# Patient Record
Sex: MALE | Race: WHITE | NOT HISPANIC OR LATINO | Employment: FULL TIME
[De-identification: names, ages, dates, MRNs, and addresses within clinical notes are randomized per-mention and may not be internally consistent; named-entity substitution may affect disease eponyms.]

---

## 2017-01-05 ENCOUNTER — RECORDS - HEALTHEAST (OUTPATIENT)
Dept: ADMINISTRATIVE | Facility: OTHER | Age: 52
End: 2017-01-05

## 2017-01-05 ENCOUNTER — COMMUNICATION - HEALTHEAST (OUTPATIENT)
Dept: FAMILY MEDICINE | Facility: CLINIC | Age: 52
End: 2017-01-05

## 2017-01-11 ENCOUNTER — OFFICE VISIT - HEALTHEAST (OUTPATIENT)
Dept: FAMILY MEDICINE | Facility: CLINIC | Age: 52
End: 2017-01-11

## 2017-01-11 DIAGNOSIS — L08.9 SKIN INFECTION: ICD-10-CM

## 2017-01-11 DIAGNOSIS — Z01.818 PREOP EXAMINATION: ICD-10-CM

## 2017-01-11 DIAGNOSIS — M17.11 DEGENERATIVE JOINT DISEASE OF KNEE, RIGHT: ICD-10-CM

## 2017-01-11 DIAGNOSIS — J45.20 ASTHMA, MILD INTERMITTENT, WELL-CONTROLLED: ICD-10-CM

## 2017-01-11 ASSESSMENT — MIFFLIN-ST. JEOR: SCORE: 1684.27

## 2017-01-12 ENCOUNTER — AMBULATORY - HEALTHEAST (OUTPATIENT)
Dept: FAMILY MEDICINE | Facility: CLINIC | Age: 52
End: 2017-01-12

## 2017-02-17 ENCOUNTER — THERAPY VISIT (OUTPATIENT)
Dept: PHYSICAL THERAPY | Facility: CLINIC | Age: 52
End: 2017-02-17
Payer: COMMERCIAL

## 2017-02-17 DIAGNOSIS — Z47.1 AFTERCARE FOLLOWING RIGHT KNEE JOINT REPLACEMENT SURGERY: Primary | ICD-10-CM

## 2017-02-17 DIAGNOSIS — Z96.651 AFTERCARE FOLLOWING RIGHT KNEE JOINT REPLACEMENT SURGERY: Primary | ICD-10-CM

## 2017-02-17 PROCEDURE — 97161 PT EVAL LOW COMPLEX 20 MIN: CPT | Mod: GP | Performed by: PHYSICAL THERAPIST

## 2017-02-17 PROCEDURE — 97110 THERAPEUTIC EXERCISES: CPT | Mod: GP | Performed by: PHYSICAL THERAPIST

## 2017-02-17 PROCEDURE — 97140 MANUAL THERAPY 1/> REGIONS: CPT | Mod: GP | Performed by: PHYSICAL THERAPIST

## 2017-02-17 ASSESSMENT — ACTIVITIES OF DAILY LIVING (ADL)
KNEE_ACTIVITY_OF_DAILY_LIVING_SUM: 19
GO UP STAIRS: ACTIVITY IS FAIRLY DIFFICULT
LIMPING: THE SYMPTOM AFFECTS MY ACTIVITY MODERATELY
SWELLING: THE SYMPTOM AFFECTS MY ACTIVITY SEVERELY
SQUAT: I AM UNABLE TO DO THE ACTIVITY
AS_A_RESULT_OF_YOUR_KNEE_INJURY,_HOW_WOULD_YOU_RATE_YOUR_CURRENT_LEVEL_OF_DAILY_ACTIVITY?: ABNORMAL
GO DOWN STAIRS: ACTIVITY IS FAIRLY DIFFICULT
KNEEL ON THE FRONT OF YOUR KNEE: I AM UNABLE TO DO THE ACTIVITY
RAW_SCORE: 19
HOW_WOULD_YOU_RATE_THE_CURRENT_FUNCTION_OF_YOUR_KNEE_DURING_YOUR_USUAL_DAILY_ACTIVITIES_ON_A_SCALE_FROM_0_TO_100_WITH_100_BEING_YOUR_LEVEL_OF_KNEE_FUNCTION_PRIOR_TO_YOUR_INJURY_AND_0_BEING_THE_INABILITY_TO_PERFORM_ANY_OF_YOUR_USUAL_DAILY_ACTIVITIES?: 30
STAND: ACTIVITY IS FAIRLY DIFFICULT
STIFFNESS: THE SYMPTOM AFFECTS MY ACTIVITY SEVERELY
GIVING WAY, BUCKLING OR SHIFTING OF KNEE: THE SYMPTOM AFFECTS MY ACTIVITY MODERATELY
KNEE_ACTIVITY_OF_DAILY_LIVING_SCORE: 27.14
PAIN: THE SYMPTOM AFFECTS MY ACTIVITY MODERATELY
SIT WITH YOUR KNEE BENT: I AM UNABLE TO DO THE ACTIVITY
HOW_WOULD_YOU_RATE_THE_OVERALL_FUNCTION_OF_YOUR_KNEE_DURING_YOUR_USUAL_DAILY_ACTIVITIES?: ABNORMAL
RISE FROM A CHAIR: ACTIVITY IS FAIRLY DIFFICULT
WEAKNESS: THE SYMPTOM AFFECTS MY ACTIVITY SEVERELY
WALK: ACTIVITY IS FAIRLY DIFFICULT

## 2017-02-17 NOTE — MR AVS SNAPSHOT
"              After Visit Summary   2/17/2017    Lukas Gautam    MRN: [id number]           Patient Information     Date Of Birth          1965        Visit Information        Provider Department      2/17/2017 11:00 AM Chelsie Mancuso PT [facility]        Today's Diagnoses     Aftercare following right knee joint replacement surgery    -  1       Follow-ups after your visit        Your next 10 appointments already scheduled     Feb 28, 2017 11:40 AM [facility]   DUSTIN Extremity with Chelsie Dela Cruz PT   [facility]    [address] [id number] [phone]              Who to contact     If you have questions or need follow up information about today's clinic visit or your schedule please contact [facility] PHYSICAL [facility] directly at [phone].  Normal or non-critical lab and imaging results will be communicated to you by DailySt[employer], letter or phone within 4 business days after the clinic has received the results. If you do not hear from us within 7 days, please contact the clinic through DailyStrength[employer] or phone. If you have a critical or abnormal lab result, we will notify you by phone as soon as possible.  Submit refill requests through [employer] or call your pharmacy and they will forward the refill request to us. Please allow 3 business days for your refill to be completed.          Additional Information About Your Visit        [employer] Information     [employer] lets you send messages to your doctor, view your test results, renew your prescriptions, schedule appointments and more. To sign up, go to [email or web address] . Click on \"Log in\" on the left side of the screen, which will take you to the Welcome page. Then click on \"Sign up Now\" on the right side of the page.     You will be asked to " enter the access code listed below, as well as some personal information. Please follow the directions to create your username and password.     Your access code is: [email or web address]  Expires: 2017  3:51 PM     Your access code will  in 90 days. If you need help or a new code, please call your [facility] clinic or [phone].        Care EveryWhere ID     This is your Care EveryWhere ID. This could be used by other organizations to access your [facility] medical records  [id number]         Blood Pressure from Last 3 Encounters:   No data found for BP    Weight from Last 3 Encounters:   No data found for Wt              We Performed the Following     HC PT EVAL, LOW COMPLEXITY     DUSTIN INITIAL EVAL REPORT     MANUAL THER TECH,1+REGIONS,EA 15 MIN     THERAPEUTIC EXERCISES        Primary Care Provider    None Specified       No primary provider on file.        Thank you!     Thank you for choosing [facility]ER[facility]  for your care. Our goal is always to provide you with excellent care. Hearing back from our patients is one way we can continue to improve our services. Please take a few minutes to complete the written survey that you may receive in the mail after your visit with us. Thank you!             Your Updated Medication List - Protect others around you: Learn how to safely use, store and throw away your medicines at www.disposemymeds.org.      Notice  As of 2017  3:51 PM    You have not been prescribed any medications.

## 2017-02-17 NOTE — LETTER
[facility] PHYSICAL THERAPY  [address]  [facility] [address]  [phone]    2017    Re: Lukas Gautam   :   1965  MRN:  [id number]   REFERRING PHYSICIAN:   Gianfranco Rai    [facility] PHYSICAL [facility]    Date of Initial Evaluation:  2017  Visits:  Rxs Used: 1  Reason for Referral:  Aftercare following right knee joint replacement surgery    EVALUATION SUMMARY    Subjective:  Lukas Gautam is a 52 year old male with a right knee condition.  Condition occurred with:  Degenerative joint disease. This is a new condition  Patient underwent a R TKA on 2/10/17.    Patient reports pain:  In the joint (general). Pain is described as aching and is constant and reported as 4/10.  Associated symptoms:  Buckling/giving out, loss of strength, loss of motion/stiffness, edema, numbness and tingling. Pain is worse during the night.  Symptoms are exacerbated by bending/squatting, ascending stairs, descending stairs, standing, walking, kneeling and transfers (sitting with knee bent) and relieved by ice.  Since onset symptoms are gradually improving.  Special testing: none since surgery.      General health as reported by patient is excellent.  Pertinent medical history includes:  None.  Medical allergies: no.  Other surgeries include:  Orthopedic surgery (s/p L BLANKA 16).  Current medications:  Pain medication.  Current occupation is .  Patient is working in normal job with restrictions.  Primary job tasks include:  Prolonged sitting (computer).  Barriers include:  Stairs.  Red flags:  None as reported by the patient.                  Objective:  Gait:    Gait Type:  Antalgic   Assistive Devices:  Crutches  Deviations:  Knee:  Knee flexion decr R and knee extension decr RAnkle:  Heel strike decr L and push off decr RGeneral Deviations:  Merle decr and stance time  decr  Flexibility/Screens:   Lower Extremity:  Decreased right lower extremity flexibility:  Quadriceps; Hamstrings and Gastroc       Knee Evaluation:  ROM:    AROM  Hyperextension: Left:     Right: 0  Extension: Left:    Right:  5  Flexion: Left:   Right: 98  PROM  Flexion: Left:   Right:  100 while on bike  Pain: and edema limiting R knee AROM  Strength:   Extension:  Left: 5/5    Pain:-      Right: 3-/5    Pain:+  Flexion:  Left: 5/5    Pain:-      Right: 3-/5    Pain:+    Quad Set Left:  WNL    Pain: -   Quad Set Right:  Good    Pain: +  Palpation:    Right knee tenderness present at:  Patellar Medial; Patellar Lateral and Patellar Superior  Edema:  Edema of the knee: pitting edema along lateral aspext of proximal lower leg and proximal  posterolateral thigh, echymosis visble about posterolateral thigh, ballotment of patella   Circumference:  Joint Line:  Left:  41 cm   Right:  48 cm  Functional Testing:    Proprioception:   Stork Balance Test:  Left:  60 seconds  Right:  Unable  % of Uninvolved:     Assessment/Plan:    Patient is a 52 year old male with right side knee complaints.    Patient has the following significant findings with corresponding treatment plan.                Diagnosis 1:  S/p R TKA  Pain -  hot/cold therapy, manual therapy, self management, education and home program  Decreased ROM/flexibility - manual therapy, therapeutic exercise and home program  Decreased strength - therapeutic exercise, therapeutic activities and home program  Decreased proprioception - neuro re-education, gait training, therapeutic activities and home program  Impaired gait - gait training, assistive devices and home program  Decreased function - therapeutic activities and home program    Therapy Evaluation Codes:   1) History comprised of:   Personal factors that impact the plan of care:      None.    Comorbidity factors that impact the plan of care are:      None.     Medications impacting care:  Pain.  2) Examination of Body Systems comprised of:   Body structures and functions that impact the plan of care:      Hip and Knee.   Activity limitations that impact the plan of care are:      Bathing, Bending, Driving, Sitting, Squatting/kneeling, Standing and Walking.  3) Clinical presentation characteristics are:   Stable/Uncomplicated.  4) Decision-Making    Low complexity using standardized patient assessment instrument and/or measureable assessment of functional outcome.  Re: Lukas Gautam   :   1965    Cumulative Therapy Evaluation is: Low complexity.    Previous and current functional limitations:  (See Goal Flow Sheet for this information)    Short term and Long term goals: (See Goal Flow Sheet for this information)     Communication ability:  Patient appears to be able to clearly communicate and understand verbal and written communication and follow directions correctly.  Treatment Explanation - The following has been discussed with the patient:   RX ordered/plan of care  Anticipated outcomes  Possible risks and side effects  This patient would benefit from PT intervention to resume normal activities.   Rehab potential is good.    Frequency:  1 X week, once daily  Duration:  for 6 weeks  Discharge Plan:  Achieve all LTG.  Independent in home treatment program.  Reach maximal therapeutic benefit.    Thank you for your referral.    INQUIRIES  Therapist: Chelsie Millard, PT  [facility]  [address] [address]  Phone: [phone]  Fax: [phone]

## 2017-02-17 NOTE — PROGRESS NOTES
[facility] Initial Evaluation      Subjective:    Lukas Gautam is a 52 year old male with a right knee condition.  Condition occurred with:  Degenerative joint disease.    This is a new condition  Patient underwent a R TKA on 2/10/17.    Patient reports pain:  In the joint (general).    Pain is described as aching and is constant and reported as 4/10.  Associated symptoms:  Buckling/giving out, loss of strength, loss of motion/stiffness, edema, numbness and tingling. Pain is worse during the night.  Symptoms are exacerbated by bending/squatting, ascending stairs, descending stairs, standing, walking, kneeling and transfers (sitting with knee bent) and relieved by ice.  Since onset symptoms are gradually improving.  Special testing: none since surgery.      General health as reported by patient is excellent.  Pertinent medical history includes:  None.  Medical allergies: no.  Other surgeries include:  Orthopedic surgery (s/p L BLANKA 9/23/16).  Current medications:  Pain medication.  Current occupation is .  Patient is working in normal job with restrictions.  Primary job tasks include:  Prolonged sitting (computer).    Barriers include:  Stairs.    Red flags:  None as reported by the patient.                      Objective:      Gait:    Gait Type:  Antalgic   Assistive Devices:  Crutches  Deviations:  Knee:  Knee flexion decr R and knee extension decr RAnkle:  Heel strike decr L and push off decr RGeneral Deviations:  Merle decr and stance time decr    Flexibility/Screens:       Lower Extremity:      Decreased right lower extremity flexibility:  Quadriceps; Hamstrings and Gastroc                                                      Knee Evaluation:  ROM:    AROM    Hyperextension: Left:     Right: 0  Extension: Left:    Right:  5  Flexion: Left:   Right: 98  PROM        Flexion: Left:   Right:  100 while on bike  Pain: and edema limiting R knee AROM    Strength:     Extension:   Left: 5/5    Pain:-      Right: 3-/5    Pain:+  Flexion:  Left: 5/5    Pain:-      Right: 3-/5    Pain:+    Quad Set Left:  WNL    Pain: -   Quad Set Right:  Good    Pain: +      Palpation:      Right knee tenderness present at:  Patellar Medial; Patellar Lateral and Patellar Superior  Edema:  Edema of the knee: pitting edema along lateral aspext of proximal lower leg and proximal  posterolateral thigh, echymosis visble about posterolateral thigh, ballotment of patella   Circumference:      Joint Line:  Left:  41 cm   Right:  48 cm      Functional Testing:            Proprioception:   Stork Balance Test:  Left:  60 seconds  Right:  Unable  % of Uninvolved:           General     ROS    Assessment/Plan:      Patient is a 52 year old male with right side knee complaints.    Patient has the following significant findings with corresponding treatment plan.                Diagnosis 1:  S/p R TKA  Pain -  hot/cold therapy, manual therapy, self management, education and home program  Decreased ROM/flexibility - manual therapy, therapeutic exercise and home program  Decreased strength - therapeutic exercise, therapeutic activities and home program  Decreased proprioception - neuro re-education, gait training, therapeutic activities and home program  Impaired gait - gait training, assistive devices and home program  Decreased function - therapeutic activities and home program    Therapy Evaluation Codes:   1) History comprised of:   Personal factors that impact the plan of care:      None.    Comorbidity factors that impact the plan of care are:      None.     Medications impacting care: Pain.  2) Examination of Body Systems comprised of:   Body structures and functions that impact the plan of care:      Hip and Knee.   Activity limitations that impact the plan of care are:      Bathing, Bending, Driving, Sitting, Squatting/kneeling, Standing and Walking.  3) Clinical presentation characteristics  are:   Stable/Uncomplicated.  4) Decision-Making    Low complexity using standardized patient assessment instrument and/or measureable assessment of functional outcome.  Cumulative Therapy Evaluation is: Low complexity.    Previous and current functional limitations:  (See Goal Flow Sheet for this information)    Short term and Long term goals: (See Goal Flow Sheet for this information)     Communication ability:  Patient appears to be able to clearly communicate and understand verbal and written communication and follow directions correctly.  Treatment Explanation - The following has been discussed with the patient:   RX ordered/plan of care  Anticipated outcomes  Possible risks and side effects  This patient would benefit from PT intervention to resume normal activities.   Rehab potential is good.    Frequency:  1 X week, once daily  Duration:  for 6 weeks  Discharge Plan:  Achieve all LTG.  Independent in home treatment program.  Reach maximal therapeutic benefit.    Please refer to the daily flowsheet for treatment today, total treatment time and time spent performing 1:1 timed codes.

## 2017-02-28 ENCOUNTER — THERAPY VISIT (OUTPATIENT)
Dept: PHYSICAL THERAPY | Facility: CLINIC | Age: 52
End: 2017-02-28
Payer: COMMERCIAL

## 2017-02-28 DIAGNOSIS — Z47.1 AFTERCARE FOLLOWING RIGHT KNEE JOINT REPLACEMENT SURGERY: ICD-10-CM

## 2017-02-28 DIAGNOSIS — Z96.651 AFTERCARE FOLLOWING RIGHT KNEE JOINT REPLACEMENT SURGERY: ICD-10-CM

## 2017-02-28 PROCEDURE — 97110 THERAPEUTIC EXERCISES: CPT | Mod: GP | Performed by: PHYSICAL THERAPIST

## 2017-02-28 PROCEDURE — 97112 NEUROMUSCULAR REEDUCATION: CPT | Mod: GP | Performed by: PHYSICAL THERAPIST

## 2017-02-28 PROCEDURE — 97140 MANUAL THERAPY 1/> REGIONS: CPT | Mod: GP | Performed by: PHYSICAL THERAPIST

## 2017-03-16 ENCOUNTER — THERAPY VISIT (OUTPATIENT)
Dept: PHYSICAL THERAPY | Facility: CLINIC | Age: 52
End: 2017-03-16
Payer: COMMERCIAL

## 2017-03-16 DIAGNOSIS — Z47.1 AFTERCARE FOLLOWING RIGHT KNEE JOINT REPLACEMENT SURGERY: ICD-10-CM

## 2017-03-16 DIAGNOSIS — Z96.651 AFTERCARE FOLLOWING RIGHT KNEE JOINT REPLACEMENT SURGERY: ICD-10-CM

## 2017-03-16 PROCEDURE — 97140 MANUAL THERAPY 1/> REGIONS: CPT | Mod: GP | Performed by: PHYSICAL THERAPIST

## 2017-03-16 PROCEDURE — 97110 THERAPEUTIC EXERCISES: CPT | Mod: GP | Performed by: PHYSICAL THERAPIST

## 2017-04-26 ENCOUNTER — THERAPY VISIT (OUTPATIENT)
Dept: PHYSICAL THERAPY | Facility: CLINIC | Age: 52
End: 2017-04-26
Payer: COMMERCIAL

## 2017-04-26 DIAGNOSIS — Z47.1 AFTERCARE FOLLOWING RIGHT KNEE JOINT REPLACEMENT SURGERY: ICD-10-CM

## 2017-04-26 DIAGNOSIS — Z96.651 AFTERCARE FOLLOWING RIGHT KNEE JOINT REPLACEMENT SURGERY: ICD-10-CM

## 2017-04-26 PROCEDURE — 97110 THERAPEUTIC EXERCISES: CPT | Mod: GP | Performed by: PHYSICAL THERAPIST

## 2017-04-26 PROCEDURE — 97112 NEUROMUSCULAR REEDUCATION: CPT | Mod: GP | Performed by: PHYSICAL THERAPIST

## 2017-04-26 ASSESSMENT — ACTIVITIES OF DAILY LIVING (ADL)
LIMPING: I HAVE THE SYMPTOM BUT IT DOES NOT AFFECT MY ACTIVITY
KNEEL ON THE FRONT OF YOUR KNEE: ACTIVITY IS SOMEWHAT DIFFICULT
PAIN: I HAVE THE SYMPTOM BUT IT DOES NOT AFFECT MY ACTIVITY
GIVING WAY, BUCKLING OR SHIFTING OF KNEE: I DO NOT HAVE THE SYMPTOM
GO UP STAIRS: ACTIVITY IS MINIMALLY DIFFICULT
WEAKNESS: THE SYMPTOM AFFECTS MY ACTIVITY SLIGHTLY
RISE FROM A CHAIR: ACTIVITY IS NOT DIFFICULT
HOW_WOULD_YOU_RATE_THE_CURRENT_FUNCTION_OF_YOUR_KNEE_DURING_YOUR_USUAL_DAILY_ACTIVITIES_ON_A_SCALE_FROM_0_TO_100_WITH_100_BEING_YOUR_LEVEL_OF_KNEE_FUNCTION_PRIOR_TO_YOUR_INJURY_AND_0_BEING_THE_INABILITY_TO_PERFORM_ANY_OF_YOUR_USUAL_DAILY_ACTIVITIES?: 75
SIT WITH YOUR KNEE BENT: ACTIVITY IS NOT DIFFICULT
KNEE_ACTIVITY_OF_DAILY_LIVING_SUM: 55
STIFFNESS: I HAVE THE SYMPTOM BUT IT DOES NOT AFFECT MY ACTIVITY
KNEE_ACTIVITY_OF_DAILY_LIVING_SCORE: 78.57
STAND: ACTIVITY IS MINIMALLY DIFFICULT
HOW_WOULD_YOU_RATE_THE_OVERALL_FUNCTION_OF_YOUR_KNEE_DURING_YOUR_USUAL_DAILY_ACTIVITIES?: NEARLY NORMAL
WALK: ACTIVITY IS MINIMALLY DIFFICULT
AS_A_RESULT_OF_YOUR_KNEE_INJURY,_HOW_WOULD_YOU_RATE_YOUR_CURRENT_LEVEL_OF_DAILY_ACTIVITY?: NEARLY NORMAL
SWELLING: I HAVE THE SYMPTOM BUT IT DOES NOT AFFECT MY ACTIVITY
RAW_SCORE: 55
GO DOWN STAIRS: ACTIVITY IS SOMEWHAT DIFFICULT
SQUAT: ACTIVITY IS SOMEWHAT DIFFICULT

## 2017-04-26 NOTE — MR AVS SNAPSHOT
"              After Visit Summary   2017    Lukas Gautam    MRN: [id number]           Patient Information     Date Of Birth          1965        Visit Information        Provider Department      2017 9:40 AM [facility]        Today's Diagnoses     Aftercare following right knee joint replacement surgery           Follow-ups after your visit        Who to contact     If you have questions or need follow up information about today's clinic visit or your schedule please contact [facility] directly at [phone].  Normal or non-critical lab and imaging results will be communicated to you by MyChart, letter or phone within 4 business days after the clinic has received the results. If you do not hear from us within 7 days, please contact the clinic through [employer] or phone. If you have a critical or abnormal lab result, we will notify you by phone as soon as possible.  Submit refill requests through [employer] or call your pharmacy and they will forward the refill request to us. Please allow 3 business days for your refill to be completed.          Additional Information About Your Visit        MyChart Information     [employer] lets you send messages to your doctor, view your test results, renew your prescriptions, schedule appointments and more. To sign up, go to [email or web address] . Click on \"Log in\" on the left side of the screen, which will take you to the Welcome page. Then click on \"Sign up Now\" on the right side of the page.     You will be asked to enter the access code listed below, as well as some personal information. Please follow the directions to create your username and password.     Your access code is: [email or web address]  Expires: 2017  4:51 PM     Your access code will  in 90 days. If you need help or a new code, please call your [facility] " clinic or [phone].        Care EveryWhere ID     This is your Care EveryWhere ID. This could be used by other organizations to access your [facility] medical records  [id number]         Blood Pressure from Last 3 Encounters:   No data found for BP    Weight from Last 3 Encounters:   No data found for Wt              We Performed the Following     DUSTIN PROGRESS NOTES REPORT     NEUROMUSCULAR RE-EDUCATION     THERAPEUTIC EXERCISES        Primary Care Provider    None Specified       No primary provider on file.        Thank you!     Thank you for choosing [facility] PHYSICAL THERAPY  for your care. Our goal is always to provide you with excellent care. Hearing back from our patients is one way we can continue to improve our services. Please take a few minutes to complete the written survey that you may receive in the mail after your visit with us. Thank you!             Your Updated Medication List - Protect others around you: Learn how to safely use, store and throw away your medicines at www.disposemymeds.org.      Notice  As of 4/26/2017 11:59 PM    You have not been prescribed any medications.

## 2017-04-26 NOTE — LETTER
[facility] PHYSICAL THERAPY  [address]  [place] [address]  [phone]    May 1, 2017    Re: Lukas Gautam   :   1965  MRN:  [id number]   REFERRING PHYSICIAN:   Gianfranco Rai    [facility] PHYSICAL THERAPY    Date of Initial Evaluation:  2017  Visits:  Rxs Used: 4  Reason for Referral:  Aftercare following right knee joint replacement surgery    PROGRESS  REPORT  Progress reporting period is from 17 to 17.       SUBJECTIVE  HPI  Knee Activity of Daily Living Score: 78.57 Subjective: Patient feeling 75-80% improved since having R TKA. Overall, sleeping much better, has no pain with walking all desired distances, continues to experience slight pain when descending steps. Pleased that he has been able to resume riding his mountain bike and has successfully completed several longer rides. Will be taking a hiking trip next month and concerned about the uneven terrain and possible steep climbing.       Current Pain level: 1/10.     Initial Pain level: 4/10.   Changes in function:  Yes (See Goal flowsheet attached for changes in current functional level)  Adverse reaction to treatment or activity: None    OBJECTIVE  Changes noted in objective findings:  Yes, pt now ambulating without AD with normal gait. Improved AROM of right knee 0-0-120(from 0-5-98). Improved MMT of right knee; quad strength 4/5(from 3-/5), hamstrings 4/5(from 3-/5). SLS on right x 25 seconds(from being unable). Reduced edema; circumfrence of right knee 42.5 cm(from 48 cm), no pitting edema present.  ADL score improved from 27% to 78%.    ASSESSMENT/PLAN  Updated problem list and treatment plan: Diagnosis 1:  S/p R TKA  Pain -  manual therapy, self management, education and home program  Decreased ROM/flexibility - therapeutic exercise, therapeutic activity and home program  Decreased strength - therapeutic  exercise, therapeutic activities and home program  Decreased proprioception - neuro re-education, gait training, therapeutic activities and home program  Decreased function - therapeutic activities and home program  Re: Lukas Gautam   :   1965    STG/LTGs have been met or progress has been made towards goals:  Yes (See Goal flow sheet completed today.)  Assessment of Progress: The patient's condition is improving.  The patient's condition has potential to improve.  Self Management Plans:  Patient has been instructed in a home treatment program.  Patient  has been instructed in self management of symptoms.  I have re-evaluated this patient and find that the nature, scope, duration and intensity of the therapy is appropriate for the medical condition of the patient.  Lukas continues to require the following intervention to meet STG and LTG's:  PT    Recommendations:  This patient would benefit from continued therapy.     Frequency:  1 X week, once daily, every other week  Duration:  for 4 weeks(2 additional visits)    Thank you for your referral.    INQUIRIES  Therapist: Chelsie Millard, PT  [facility]  [address] [address]  Phone: [phone]  Fax: [phone]

## 2017-04-30 NOTE — PROGRESS NOTES
Subjective:    HPI       Knee Activity of Daily Living Score: 78.57            Objective:    System    Physical Exam    General     ROS    Assessment/Plan:      PROGRESS  REPORT    Progress reporting period is from 2/17/17 to 4/26/17.       SUBJECTIVE  Subjective: Patient feeling 75-80% improved since having R TKA. Overall, sleeping much better, has no pain with walking all desired distances, continues to experience slight pain when descending steps. Pleased that he has been able to resume riding his mountain bike and has successfully completed several longer rides. Will be taking a hiking trip next month and concerned about the uneven terrain and possible steep climbing.       Current Pain level: 1/10.     Initial Pain level: 4/10.   Changes in function:  Yes (See Goal flowsheet attached for changes in current functional level)  Adverse reaction to treatment or activity: None    OBJECTIVE  Changes noted in objective findings:  Yes, pt now ambulating without AD with normal gait. Improved AROM of right knee 0-0-120(from 0-5-98). Improved MMT of right knee; quad strength 4/5(from 3-/5), hamstrings 4/5(from 3-/5). SLS on right x 25 seconds(from being unable). Reduced edema; circumfrence of right knee 42.5 cm(from 48 cm), no pitting edema present.  ADL score improved from 27% to 78%.    ASSESSMENT/PLAN  Updated problem list and treatment plan: Diagnosis 1:  S/p R TKA  Pain -  manual therapy, self management, education and home program  Decreased ROM/flexibility - therapeutic exercise, therapeutic activity and home program  Decreased strength - therapeutic exercise, therapeutic activities and home program  Decreased proprioception - neuro re-education, gait training, therapeutic activities and home program  Decreased function - therapeutic activities and home program  STG/LTGs have been met or progress has been made towards goals:  Yes (See Goal flow sheet completed today.)  Assessment of Progress: The patient's  condition is improving.  The patient's condition has potential to improve.  Self Management Plans:  Patient has been instructed in a home treatment program.  Patient  has been instructed in self management of symptoms.  I have re-evaluated this patient and find that the nature, scope, duration and intensity of the therapy is appropriate for the medical condition of the patient.  Lukas continues to require the following intervention to meet STG and LTG's:  PT    Recommendations:  This patient would benefit from continued therapy.     Frequency:  1 X week, once daily, every other week  Duration:  for 4 weeks(2 additional visits)        Please refer to the daily flowsheet for treatment today, total treatment time and time spent performing 1:1 timed codes.

## 2017-07-18 ENCOUNTER — AMBULATORY - HEALTHEAST (OUTPATIENT)
Dept: FAMILY MEDICINE | Facility: CLINIC | Age: 52
End: 2017-07-18

## 2017-07-18 DIAGNOSIS — J45.20 ASTHMA, MILD INTERMITTENT, WELL-CONTROLLED: ICD-10-CM

## 2017-08-16 ENCOUNTER — AMBULATORY - HEALTHEAST (OUTPATIENT)
Dept: FAMILY MEDICINE | Facility: CLINIC | Age: 52
End: 2017-08-16

## 2017-10-03 ENCOUNTER — THERAPY VISIT (OUTPATIENT)
Dept: PHYSICAL THERAPY | Facility: CLINIC | Age: 52
End: 2017-10-03
Payer: COMMERCIAL

## 2017-10-03 DIAGNOSIS — Z47.1 AFTERCARE FOLLOWING RIGHT KNEE JOINT REPLACEMENT SURGERY: ICD-10-CM

## 2017-10-03 DIAGNOSIS — M54.42 LEFT-SIDED LOW BACK PAIN WITH LEFT-SIDED SCIATICA, UNSPECIFIED CHRONICITY: Primary | ICD-10-CM

## 2017-10-03 DIAGNOSIS — Z96.651 AFTERCARE FOLLOWING RIGHT KNEE JOINT REPLACEMENT SURGERY: ICD-10-CM

## 2017-10-03 PROCEDURE — 97110 THERAPEUTIC EXERCISES: CPT | Mod: GP | Performed by: PHYSICAL THERAPIST

## 2017-10-03 PROCEDURE — 97164 PT RE-EVAL EST PLAN CARE: CPT | Mod: GP | Performed by: PHYSICAL THERAPIST

## 2017-10-03 PROCEDURE — 97112 NEUROMUSCULAR REEDUCATION: CPT | Mod: GP | Performed by: PHYSICAL THERAPIST

## 2017-10-03 NOTE — LETTER
[facility] ATHLETIC [facility] PHYSICAL THERAPY  [address]  [facility] [address]  [phone]    2017    Re: Lukas Gautam   :   1965  MRN:  [id number]   REFERRING PHYSICIAN:   Referred Self    [facility]    Date of Initial Evaluation:    Visits:  Rxs Used: 5  Reason for Referral:     Aftercare following right knee joint replacement surgery  Left-sided low back pain with left-sided sciatica, unspecified chronicity    DISCHARGE REPORT FOR RIGHT KNEE/EVALUATION OF LUMBAR SPINE  Progress reporting period is from 17 to 10/3/17 for right knee.     SUBJECTIVE  Subjective: Patient returned to MD for a follow-up for R knee, now s/p 7+ months ago. His right knee is doing great, is pain free and he hardly ever thinks about it. Is able to ascend and descend steps without pain.  Patient brought concern to MD and PT in clinic today about recent onset of left sided lumbar/glut/thigh/lower leg sx. Patient wondering if these symptoms could be related to any changes in his walking since having the R TKA. Patient describes intermittent left L-S area, glut and thigh pain with constant numbness/tingling in left lower leg and last 3 toes. Onset ~4-6 weeks ago, which was insidious. PL's range from 1-5/10. Increased sx with sitting, walking and bending to don/doff shoes/socks.         Current Pain level: 0/10 (knee, LB 3/10).     Initial Pain level: 4/10.   Changes in function:  Knee: Yes (See Goal flowsheet attached for changes in current functional level)  Adverse reaction to treatment or activity: None    OBJECTIVE  Objective:   Knee: Ambulating into clinic with NL gait. AROM of right knee 0-0-127. No edema remaining in right knee. MMT of right quads 5/5 and hamstrings 5/5. SLS x 60+ seconds with eyes open.   Lumbar:  AROM of lumbar spine: flexion min loss, extension mod-max loss, R/L SG's no loss.  Fair sitting posture, correction with lumbar support improved symptoms. Fair standing posture with reduce lumbar lordosis. Decreased P/A mobility of lumbar segments. MMT of lumbar myotomes: WNL. FIS: NE during/NE afterwards. Rep FIS NE/NE. EIS NE during/NE afterwards. Prone lying x10' decrease left lower leg symptoms/B afterwards. EIL NE/NE. RepEIL centralizing/B increased ROM.       ASSESSMENT/PLAN  Updated problem list and treatment plan: Diagnosis 1:  S/p R TKA  none  Re: Lukas Gautam   :   1965    Diagnosis 2:  Left LBP with sciatica  Pain -  manual therapy, self management, education, directional preference exercise and home program  Decreased ROM/flexibility - manual therapy, therapeutic exercise and home program  Decreased joint mobility - manual therapy, therapeutic exercise, therapeutic activity and home program  Decreased function - therapeutic activities and home program  Impaired posture - neuro re-education, therapeutic activities and home program  STG/LTGs have been met or progress has been made towards goals:  Knee:Yes (See Goal flow sheet completed today.)  Assessment of Progress: The patient has met all of their long term goals for right knee. Positive response to extension principles(LB) in the sagittal plane with decreased intensity of left foot/toe numbness. Provisional diagnosis of lumbar derangement.  Self Management Plans: Patient has been instructed in a home treatment program.  Patient  has been instructed in self management of symptoms.  I have re-evaluated this patient and find that the nature, scope, duration and intensity of the therapy is appropriate for the medical condition of the patient.  Lukas continues to require the following intervention to meet STG and LTG's:  PT for Left LBP with left sciatica    Recommendations:  This patient would benefit from continued therapy for new Lumbar symptoms. Ready to discontinue PT for right knee and resume independent  management.  Frequency:  1 X week, every other week, once daily  Duration:  for 8 weeks(up to 4 visits)  Please see PT orders to sign and return for treatment of LBP.    Thank you for your referral.    INQUIRIES  Therapist: Chelsie Millard, PT  [facility] [place] PHYSICAL THERAPY  [address] [address]  Phone: [phone]  Fax: [phone]

## 2017-10-03 NOTE — MR AVS SNAPSHOT
"              After Visit Summary   10/3/2017    Lukas Gautam    MRN: [id number]           Patient Information     Date Of Birth          1965        Visit Information        Provider Department      10/3/2017 1:00 PM Chelsie Mancuos PT [facility] Athletic [facility] Physical [facility]        Today's Diagnoses     Left-sided low back pain with left-sided sciatica, unspecified chronicity    -  1    Aftercare following right knee joint replacement surgery           Follow-ups after your visit        Who to contact     If you have questions or need follow up information about today's clinic visit or your schedule please contact [facility] ATHLETIC [facility] PHYSICAL [facility] directly at [phone].  Normal or non-critical lab and imaging results will be communicated to you by [employer], letter or phone within 4 business days after the clinic has received the results. If you do not hear from us within 7 days, please contact the clinic through [employer] or phone. If you have a critical or abnormal lab result, we will notify you by phone as soon as possible.  Submit refill requests through [employer] or call your pharmacy and they will forward the refill request to us. Please allow 3 business days for your refill to be completed.          Additional Information About Your Visit        MyChart Information     [employer] lets you send messages to your doctor, view your test results, renew your prescriptions, schedule appointments and more. To sign up, go to [email or web address] . Click on \"Log in\" on the left side of the screen, which will take you to the Welcome page. Then click on \"Sign up Now\" on the right side of the page.     You will be asked to enter the access code listed below, as well as some personal information. Please follow the directions to create your username and password.     Your access code is: [email or web address]  Expires: 1/3/2018 12:33 AM     Your access code " will  in 90 days. If you need help or a new code, please call your [place] clinic or [phone].        Care EveryWhere ID     This is your Care EveryWhere ID. This could be used by other organizations to access your [place] medical records  [id number]         Blood Pressure from Last 3 Encounters:   No data found for BP    Weight from Last 3 Encounters:   No data found for Wt              We Performed the Following     DUSTIN PROGRESS NOTES REPORT     NEUROMUSCULAR RE-EDUCATION     PT Re-Eval ([id number])     THERAPEUTIC EXERCISES        Primary Care Provider    None Specified       No primary provider on file.        Equal Access to Services     [facility]: Hadii aad ku hadasho Soomaali, waaxda luqadaha, qaybta kaalmada adeegyadave, patricia silva . So [facility] [phone].    ATENCIÓN: Si habla español, tiene a wahl disposición servicios gratuitos de asistencia lingüística. Llame al [phone].    We comply with applicable federal civil rights laws and [place] laws. We do not discriminate on the basis of race, color, national origin, age, disability, sex, sexual orientation, or gender identity.            Thank you!     Thank you for choosing [facility] PHYSICAL THERAPY  for your care. Our goal is always to provide you with excellent care. Hearing back from our patients is one way we can continue to improve our services. Please take a few minutes to complete the written survey that you may receive in the mail after your visit with us. Thank you!             Your Updated Medication List - Protect others around you: Learn how to safely use, store and throw away your medicines at www.disposemymeds.org.      Notice  As of 10/3/2017 11:59 PM    You have not been prescribed any medications.

## 2017-10-05 PROBLEM — M54.42 LEFT-SIDED LOW BACK PAIN WITH LEFT-SIDED SCIATICA, UNSPECIFIED CHRONICITY: Status: ACTIVE | Noted: 2017-10-05

## 2017-10-05 NOTE — PROGRESS NOTES
Subjective:    HPI                    Objective:    System    Physical Exam    General     ROS    Assessment/Plan:      DISCHARGE REPORT FOR RIGHT KNEE/EVALUATION OF LUMBAR SPINE    Progress reporting period is from 2/17/17 to 10/3/17 for right knee.     SUBJECTIVE  Subjective: Patient returned to MD for a follow-up for R knee, now s/p 7+ months ago. His right knee is doing great, is pain free and he hardly ever thinks about it. Is able to ascend and descend steps without pain.  Patient brought concern to MD and PT in clinic today about recent onset of left sided lumbar/glut/thigh/lower leg sx. Patient wondering if these symptoms could be related to any changes in his walking since having the R TKA. Patient describes intermittent left L-S area, glut and thigh pain with constant numbness/tingling in left lower leg and last 3 toes. Onset ~4-6 weeks ago, which was insidious. PL's range from 1-5/10. Increased sx with sitting, walking and bending to don/doff shoes/socks.         Current Pain level: 0/10 (knee, LB 3/10).     Initial Pain level: 4/10.   Changes in function:  Knee: Yes (See Goal flowsheet attached for changes in current functional level)  Adverse reaction to treatment or activity: None    OBJECTIVE    Objective: Knee: Ambulating into clinic with NL gait. AROM of right knee 0-0-127. No edema remaining in right knee. MMT of right quads 5/5 and hamstrings 5/5. SLS x 60+ seconds with eyes open.   Lumbar:  AROM of lumbar spine: flexion min loss, extension mod-max loss, R/L SG's no loss. Fair sitting posture, correction with lumbar support improved symptoms. Fair standing posture with reduce lumbar lordosis. Decreased P/A mobility of lumbar segments. MMT of lumbar myotomes: WNL. FIS: NE during/NE afterwards. Rep FIS NE/NE. EIS NE during/NE afterwards. Prone lying x10' decrease left lower leg symptoms/B afterwards. EIL NE/NE. RepEIL centralizing/B increased ROM.       ASSESSMENT/PLAN  Updated problem list and  treatment plan: Diagnosis 1:  S/p R TKA  none  Diagnosis 2:  Left LBP with sciatica  Pain -  manual therapy, self management, education, directional preference exercise and home program  Decreased ROM/flexibility - manual therapy, therapeutic exercise and home program  Decreased joint mobility - manual therapy, therapeutic exercise, therapeutic activity and home program  Decreased function - therapeutic activities and home program  Impaired posture - neuro re-education, therapeutic activities and home program  STG/LTGs have been met or progress has been made towards goals:  Knee:Yes (See Goal flow sheet completed today.)  Assessment of Progress: The patient has met all of their long term goals for right knee. Positive response to extension principles(LB) in the sagittal plane with decreased intensity of left foot/toe numbness. Provisional diagnosis of lumbar derangement.  Self Management Plans: Patient has been instructed in a home treatment program.  Patient  has been instructed in self management of symptoms.  I have re-evaluated this patient and find that the nature, scope, duration and intensity of the therapy is appropriate for the medical condition of the patient.  Lukas continues to require the following intervention to meet STG and LTG's:  PT for Left LBP with left sciatica    Recommendations:  This patient would benefit from continued therapy for new Lumbar symptoms. Ready to discontinue PT for right knee and resume independent management.  Frequency:  1 X week, every other week, once daily  Duration:  for 8 weeks(up to 4 visits)  Please see PT orders to sign and return for treatment of LBP.        Please refer to the daily flowsheet for treatment today, total treatment time and time spent performing 1:1 timed codes.

## 2018-04-11 ENCOUNTER — OFFICE VISIT - HEALTHEAST (OUTPATIENT)
Dept: FAMILY MEDICINE | Facility: CLINIC | Age: 53
End: 2018-04-11

## 2018-04-11 DIAGNOSIS — Z00.00 ROUTINE GENERAL MEDICAL EXAMINATION AT A HEALTH CARE FACILITY: ICD-10-CM

## 2018-04-11 DIAGNOSIS — Z12.11 SCREEN FOR COLON CANCER: ICD-10-CM

## 2018-04-11 LAB
HBA1C MFR BLD: 5.5 % (ref 3.5–6)
LDLC SERPL CALC-MCNC: 113 MG/DL
PSA SERPL-MCNC: 2.5 NG/ML (ref 0–3.5)

## 2018-04-11 ASSESSMENT — MIFFLIN-ST. JEOR: SCORE: 1676.9

## 2018-04-12 ENCOUNTER — COMMUNICATION - HEALTHEAST (OUTPATIENT)
Dept: FAMILY MEDICINE | Facility: CLINIC | Age: 53
End: 2018-04-12

## 2018-05-07 ENCOUNTER — COMMUNICATION - HEALTHEAST (OUTPATIENT)
Dept: ADMINISTRATIVE | Facility: CLINIC | Age: 53
End: 2018-05-07

## 2018-05-30 ENCOUNTER — RECORDS - HEALTHEAST (OUTPATIENT)
Dept: ADMINISTRATIVE | Facility: OTHER | Age: 53
End: 2018-05-30

## 2018-05-30 ENCOUNTER — COMMUNICATION - HEALTHEAST (OUTPATIENT)
Dept: FAMILY MEDICINE | Facility: CLINIC | Age: 53
End: 2018-05-30

## 2018-06-04 ENCOUNTER — OFFICE VISIT - HEALTHEAST (OUTPATIENT)
Dept: FAMILY MEDICINE | Facility: CLINIC | Age: 53
End: 2018-06-04

## 2018-06-04 DIAGNOSIS — D48.5 NEOPLASM OF UNCERTAIN BEHAVIOR OF SKIN OF BACK: ICD-10-CM

## 2018-06-04 DIAGNOSIS — M16.11 OSTEOARTHRITIS OF RIGHT HIP: ICD-10-CM

## 2018-06-04 DIAGNOSIS — D72.9 ABNORMAL WBC COUNT: ICD-10-CM

## 2018-06-04 DIAGNOSIS — J45.20 ASTHMA, MILD INTERMITTENT, WELL-CONTROLLED: ICD-10-CM

## 2018-06-04 DIAGNOSIS — Z01.818 PREOP EXAMINATION: ICD-10-CM

## 2018-06-04 LAB
ATRIAL RATE - MUSE: 52 BPM
DIASTOLIC BLOOD PRESSURE - MUSE: NORMAL MMHG
INTERPRETATION ECG - MUSE: NORMAL
P AXIS - MUSE: -4 DEGREES
PR INTERVAL - MUSE: 160 MS
QRS DURATION - MUSE: 96 MS
QT - MUSE: 418 MS
QTC - MUSE: 388 MS
R AXIS - MUSE: 60 DEGREES
SYSTOLIC BLOOD PRESSURE - MUSE: NORMAL MMHG
T AXIS - MUSE: 36 DEGREES
VENTRICULAR RATE- MUSE: 52 BPM

## 2018-06-04 ASSESSMENT — MIFFLIN-ST. JEOR: SCORE: 1662.72

## 2018-08-04 ENCOUNTER — COMMUNICATION - HEALTHEAST (OUTPATIENT)
Dept: FAMILY MEDICINE | Facility: CLINIC | Age: 53
End: 2018-08-04

## 2018-08-04 DIAGNOSIS — J45.20 ASTHMA, MILD INTERMITTENT, WELL-CONTROLLED: ICD-10-CM

## 2018-08-04 RX ORDER — ALBUTEROL SULFATE 90 UG/1
2 AEROSOL, METERED RESPIRATORY (INHALATION) EVERY 4 HOURS PRN
Qty: 17 G | Refills: 3 | Status: SHIPPED | OUTPATIENT
Start: 2018-08-04 | End: 2021-07-13

## 2019-02-18 ENCOUNTER — OFFICE VISIT - HEALTHEAST (OUTPATIENT)
Dept: FAMILY MEDICINE | Facility: CLINIC | Age: 54
End: 2019-02-18

## 2019-02-18 DIAGNOSIS — L08.9 LOCAL INFECTION OF SKIN AND SUBCUTANEOUS TISSUE: ICD-10-CM

## 2019-02-18 ASSESSMENT — MIFFLIN-ST. JEOR: SCORE: 1735.3

## 2019-04-04 ENCOUNTER — AMBULATORY - HEALTHEAST (OUTPATIENT)
Dept: FAMILY MEDICINE | Facility: CLINIC | Age: 54
End: 2019-04-04

## 2019-04-04 DIAGNOSIS — L98.9 SKIN LESIONS: ICD-10-CM

## 2019-04-08 ENCOUNTER — RECORDS - HEALTHEAST (OUTPATIENT)
Dept: ADMINISTRATIVE | Facility: OTHER | Age: 54
End: 2019-04-08

## 2019-06-17 ENCOUNTER — COMMUNICATION - HEALTHEAST (OUTPATIENT)
Dept: FAMILY MEDICINE | Facility: CLINIC | Age: 54
End: 2019-06-17

## 2019-06-17 DIAGNOSIS — Z12.11 SCREEN FOR COLON CANCER: ICD-10-CM

## 2019-08-07 ENCOUNTER — COMMUNICATION - HEALTHEAST (OUTPATIENT)
Dept: FAMILY MEDICINE | Facility: CLINIC | Age: 54
End: 2019-08-07

## 2019-10-28 ENCOUNTER — OFFICE VISIT - HEALTHEAST (OUTPATIENT)
Dept: FAMILY MEDICINE | Facility: CLINIC | Age: 54
End: 2019-10-28

## 2019-10-28 DIAGNOSIS — R53.83 FATIGUE, UNSPECIFIED TYPE: ICD-10-CM

## 2019-10-28 DIAGNOSIS — M79.672 PAIN IN BOTH FEET: ICD-10-CM

## 2019-10-28 DIAGNOSIS — E78.00 PURE HYPERCHOLESTEROLEMIA: ICD-10-CM

## 2019-10-28 DIAGNOSIS — Z00.00 ROUTINE GENERAL MEDICAL EXAMINATION AT A HEALTH CARE FACILITY: ICD-10-CM

## 2019-10-28 DIAGNOSIS — M79.671 PAIN IN BOTH FEET: ICD-10-CM

## 2019-10-28 LAB
ERYTHROCYTE [DISTWIDTH] IN BLOOD BY AUTOMATED COUNT: 10.9 % (ref 11–14.5)
HBA1C MFR BLD: 5.4 % (ref 3.5–6)
HCT VFR BLD AUTO: 44.3 % (ref 40–54)
HGB BLD-MCNC: 15 G/DL (ref 14–18)
LDLC SERPL CALC-MCNC: 106 MG/DL
MCH RBC QN AUTO: 32.4 PG (ref 27–34)
MCHC RBC AUTO-ENTMCNC: 33.8 G/DL (ref 32–36)
MCV RBC AUTO: 96 FL (ref 80–100)
PLATELET # BLD AUTO: 275 THOU/UL (ref 140–440)
PMV BLD AUTO: 7 FL (ref 7–10)
PSA SERPL-MCNC: 1.4 NG/ML (ref 0–3.5)
RBC # BLD AUTO: 4.62 MILL/UL (ref 4.4–6.2)
TSH SERPL DL<=0.005 MIU/L-ACNC: 1.1 UIU/ML (ref 0.3–5)
WBC: 5.2 THOU/UL (ref 4–11)

## 2019-10-28 ASSESSMENT — MIFFLIN-ST. JEOR: SCORE: 1680.3

## 2019-10-29 ENCOUNTER — COMMUNICATION - HEALTHEAST (OUTPATIENT)
Dept: FAMILY MEDICINE | Facility: CLINIC | Age: 54
End: 2019-10-29

## 2019-10-29 LAB — 25(OH)D3 SERPL-MCNC: 30 NG/ML (ref 30–80)

## 2019-11-06 ENCOUNTER — RECORDS - HEALTHEAST (OUTPATIENT)
Dept: ADMINISTRATIVE | Facility: OTHER | Age: 54
End: 2019-11-06

## 2021-03-15 ENCOUNTER — OFFICE VISIT - HEALTHEAST (OUTPATIENT)
Dept: FAMILY MEDICINE | Facility: CLINIC | Age: 56
End: 2021-03-15

## 2021-03-15 DIAGNOSIS — M54.2 CHRONIC NECK PAIN: ICD-10-CM

## 2021-03-15 DIAGNOSIS — M15.0 PRIMARY OSTEOARTHRITIS INVOLVING MULTIPLE JOINTS: ICD-10-CM

## 2021-03-15 DIAGNOSIS — G89.29 CHRONIC NECK PAIN: ICD-10-CM

## 2021-03-25 ENCOUNTER — AMBULATORY - HEALTHEAST (OUTPATIENT)
Dept: LAB | Facility: CLINIC | Age: 56
End: 2021-03-25

## 2021-03-25 ENCOUNTER — AMBULATORY - HEALTHEAST (OUTPATIENT)
Dept: FAMILY MEDICINE | Facility: CLINIC | Age: 56
End: 2021-03-25

## 2021-03-25 ENCOUNTER — COMMUNICATION - HEALTHEAST (OUTPATIENT)
Dept: TELEHEALTH | Facility: CLINIC | Age: 56
End: 2021-03-25

## 2021-03-25 DIAGNOSIS — Z02.83 ENCOUNTER FOR DRUG SCREENING: ICD-10-CM

## 2021-03-25 LAB
AMPHETAMINES UR QL SCN: ABNORMAL
BARBITURATES UR QL: ABNORMAL
BENZODIAZ UR QL: ABNORMAL
CANNABINOIDS UR QL SCN: ABNORMAL
COCAINE UR QL: ABNORMAL
CREAT UR-MCNC: 74.1 MG/DL
OPIATES UR QL SCN: ABNORMAL
OXYCODONE UR QL: ABNORMAL
PCP UR QL SCN: ABNORMAL

## 2021-03-29 ENCOUNTER — COMMUNICATION - HEALTHEAST (OUTPATIENT)
Dept: FAMILY MEDICINE | Facility: CLINIC | Age: 56
End: 2021-03-29

## 2021-05-08 ENCOUNTER — HEALTH MAINTENANCE LETTER (OUTPATIENT)
Age: 56
End: 2021-05-08

## 2021-05-27 NOTE — PROGRESS NOTES
Please see my previous clinic note for details.  Patient reports that the leg skin rash improved but did not resolve completely with the antibiotics.  Therefore, I am recommending that he have further evaluation by a dermatologist.  Referring him to see dermatology consultants, he was reminded to also have them also look at the skin spots on his back if they have not resolved.

## 2021-05-29 NOTE — TELEPHONE ENCOUNTER
Patient states that he would like MD to enter referral for colonoscopy. Patient was not able to have colonoscopy in the past due to other surgery.     Patient agrees for MD to enter referral if appropriate. Will await call from specialty schedulers.

## 2021-05-29 NOTE — TELEPHONE ENCOUNTER
reports indicate that the patient needs colonoscopy. Writer intends to contact the patient to assist in scheduling and appointing for this purpose. Per clinic policy, writer will call three times prior to closing encounter.

## 2021-05-30 VITALS — BODY MASS INDEX: 26.45 KG/M2 | HEIGHT: 70 IN | WEIGHT: 184.75 LBS

## 2021-06-01 VITALS — BODY MASS INDEX: 25.77 KG/M2 | HEIGHT: 70 IN | WEIGHT: 180 LBS

## 2021-06-01 VITALS — WEIGHT: 184 LBS | BODY MASS INDEX: 26.34 KG/M2 | HEIGHT: 70 IN

## 2021-06-02 VITALS — BODY MASS INDEX: 28.06 KG/M2 | WEIGHT: 196 LBS | HEIGHT: 70 IN

## 2021-06-03 VITALS
BODY MASS INDEX: 26.2 KG/M2 | HEART RATE: 61 BPM | TEMPERATURE: 98.1 F | WEIGHT: 183 LBS | SYSTOLIC BLOOD PRESSURE: 110 MMHG | OXYGEN SATURATION: 96 % | RESPIRATION RATE: 20 BRPM | HEIGHT: 70 IN | DIASTOLIC BLOOD PRESSURE: 64 MMHG

## 2021-06-08 NOTE — PROGRESS NOTES
Date of Surgery: 01/18/2017  Type of Surgery: Rt Knee Total replacement  Surgeon: Dr. Gianfranco Rai  [facility]  Fax: [phone]    ASSESMENT AND PLAN:    Preop examination  Labs look good as detailed below.    Please see discussion below under skin infection.  Surgery will be delayed until 2 weeks after complete resolution of his mild skin infection.    Patient is cleared to proceed with surgery with any appropriate anesthesia 2 weeks after the skin infection has resolved completely.  Reviewed this today in detail with the patient and with the orthopedic clinic.  Surgery is being rescheduled.  He will not take any NSAIDs in the 2 weeks leading up to surgery.    Degenerative joint disease of knee, right  Plan for surgery as above.    Asthma, mild intermittent, well-controlled  Excellent control, no recent exacerbation, continue current treatment plan    Mild Skin infection of left axilla as documented below  Discussed this with Dr. Rai's nurse, they recommend that elective knee replacement surgery be done to full weeks after complete resolution of any skin infection.  Discussed this with the patient in detail along with the reasoning for this related to joint infection and complications.  Patient will treat this area with warm soaks and warm compress 3 times per day until complete resolution.  If it is not improving or worsening, he will call and we will use a course of oral antibiotics, if it is significantly worsening will come in for reevaluation and consider incision and drainage.          SUBJECTIVE: 51-year-old male who underwent a successful left hip replacement surgery last year, he has been very happy with the results.  He has advanced degenerative joint disease of his right knee and is now scheduled for knee replacement surgery.  Patient has daily pain and limited range of motion and mobility because of his right knee symptoms.  Progressive worsening over the last several years.  He has a  history of mild intermittent asthma, no recent exacerbations.  Asthma control test completed today.  The patient scores 23 out of 25.  New concern is a slightly painful red raised bump in his left armpit for the last 2 days.  Actually seems smaller today than it did yesterday.  No fever or chills, no drainage, he has not had similar problems in the past.    Review of systems: No chest pain, no shortness of breath or wheezing, no fevers, no blood in the urine or blood in the stool, remainder of review of systems is as above or negative.    Social history: , 2 kids, nonsmoker.    Past surgical history: Past shoulder surgery and left hip replacement surgery without any anesthesia complications or bleeding problems.      Patient Active Problem List   Diagnosis     Hypercholesterolemia     Shortness Of Breath     Joint Pain, Localized In The Shoulder     Asthma, mild intermittent, well-controlled     Osteoarthritis, hip, bilateral     Stress fracture of hip - left     Osteoarthritis of left hip     Degenerative joint disease of knee, right     Current Outpatient Prescriptions   Medication Sig Dispense Refill     albuterol (PROVENTIL HFA;VENTOLIN HFA) 90 mcg/actuation inhaler Inhale 2 puffs every 4 (four) hours as needed for wheezing. 2 Inhaler 6     CALCIUM CARBONATE (CALCIUM 600 ORAL) Take by mouth.       magnesium 30 mg tablet Take 30 mg by mouth 2 (two) times a day.       multivitamin with minerals (THERA-M) 9 mg iron-400 mcg Tab tablet Take 1 tablet by mouth daily.       triamcinolone (KENALOG) 0.1 % cream Apply topically 2 (two) times a day. 80 g 1     No current facility-administered medications for this visit.      History   Smoking Status     Never Smoker   Smokeless Tobacco     Never Used     No family history on file.    Allergies   Allergen Reactions     Other Environmental Allergy      Bee Sting       OBJECTICE:   Visit Vitals     /74 (Patient Site: Left Arm, Patient Position: Sitting, Cuff Size:  "Adult Regular)     Pulse 72     Temp 98.5  F (36.9  C) (Oral)     Resp 18     Ht 5' 10\" (1.778 m)     Wt 184 lb 12 oz (83.8 kg)     BMI 26.51 kg/m2        Labs:   Sodium 140, potassium 4.6, creatinine 0.7, glucose 81.   WBC 5.0, hemoglobin 15.3, platelets 248,000.       Exam: GEN-alert, appropriate, no apparent distress   Eyes-sclera and conjunctiva are clear bilaterally, no corneal opacities.   ENT-oropharynx is clear, neck is supple with no palpable mass or lymphadenopathy, tympanic membranes clear bilaterally.   CV-regular rate and rhythm with no murmur   RESP-lungs clear to auscultation   ABDOMINAL-soft, nontender, no palpable masses or organomegaly   EXTREM-warm with no edema   SKIN-1 cm slightly raised firm red area in the left axilla with no palpable fluctuance.  No rash or vesicles or ulcers.   Neurologic-cranial nerves II through XII are intact, strength and sensation are symmetric.   Musculoskeletal-good range of motion at his left hip, abnormal alignment of the right knee patella and joint.      Lambert Hinton          "

## 2021-06-08 NOTE — PROGRESS NOTES
Patient reports today that he had some chills last night and this morning there are 2 red bumps in the left axilla and one red bump in the right axilla.  Given the definite worsening compared to yesterday, please see yesterday's notes for details, we are going to treat with oral antibiotics.  He will continue to use warm soaks and warm packs.  We reviewed the risks and benefits of trimethoprim sulfa as prescribed today.  We discussed indications for follow-up.  He will follow-up routinely by phone or in person in 1 week when the antibiotic course has been completed to give an update.

## 2021-06-12 NOTE — PROGRESS NOTES
Patient called reporting a 4 day history of a slightly painful swollen area in his armpit.  In the past, he had similar symptoms except it was 4 separate areas in both armpits.  Currently, it is just one area in 1 armpit.  No fever or chills.  Previously, he had complete 100% resolution of his symptoms with the 1 week course of trimethoprim sulfa.  He tolerated the medication well and did not have any side effects or problems.  This was about 8 months ago.  He had no recurrence of issues or problems up until the last few days.  We discussed options for treatment for his current symptoms.  I did recommend that he come in for an evaluation but he is currently away on a fishing trip.  We talked about initial treatment with warm packs and observation followed by the initiation of 1 week of trimethoprim sulfa if he has progressive worsening and that he needs to be seen here in the clinic for evaluation if the symptoms do not resolve 100% over the next 2 weeks.  Reviewed risks and benefits of the medication.

## 2021-06-15 NOTE — PROGRESS NOTES
Lukas Gautam is a 56 y.o. male who is being evaluated via a billable telephone visit.      ASSESMENT AND PLAN:  Diagnoses and all orders for this visit:    Primary osteoarthritis involving multiple joints    Chronic neck pain    In my opinion the patient is a good candidate for the medical marijuana program.  I am forwarding this note to my partner Dr. Jeff for formal certification.  The patient would like to be contacted by the medical marijuana program at the following email: [email or web address]      Reviewed the risks and benefits of the treatment plan with the patient and/or caregiver and we discussed indications for routine and emergent follow-up.        SUBJECTIVE: 56-year-old male with a telephone visit for ongoing problems with chronic neck and shoulder pain for several years.  Patient has a history of advanced arthritis of multiple joints.  He has undergone knee replacement and hip replacement surgery in the past and has had good results with those surgeries.  However, he has advanced arthritis in his shoulders and has been told by his orthopedic surgeon that he does not have good surgical options.  He has chronic pain in the shoulders, neck, and upper back.  Pain is moderate in severity, worsens with some activities that he does for his daily life and enjoyment.  He has had an adequate pain control with usual over-the-counter therapies and arthritis medications and is interested in medical marijuana that has worked well for a friend of his and similar issues.    No past medical history on file.  Patient Active Problem List   Diagnosis     Hypercholesterolemia     Shortness Of Breath     Joint Pain, Localized In The Shoulder     Asthma, mild intermittent, well-controlled     Osteoarthritis, hip, bilateral     Stress fracture of hip - left     Osteoarthritis of left hip     Degenerative joint disease of knee, right     Current Outpatient Medications   Medication Sig Dispense Refill      albuterol (PROAIR HFA) 90 mcg/actuation inhaler Inhale 2 puffs every 4 (four) hours as needed for wheezing. 17 g 3     CALCIUM CARBONATE (CALCIUM 600 ORAL) Take by mouth.       magnesium 30 mg tablet Take 30 mg by mouth 2 (two) times a day.       multivitamin with minerals (THERA-M) 9 mg iron-400 mcg Tab tablet Take 1 tablet by mouth daily.       No current facility-administered medications for this visit.      Social History     Tobacco Use   Smoking Status Never Smoker   Smokeless Tobacco Never Used       OBJECTICE: There were no vitals taken for this visit.         Lambert Hinton                Phone call duration: 10 minutes

## 2021-06-16 NOTE — PROGRESS NOTES
I called the pt and left him a message - I need to do a PEG eval so I can enrol him in the program.  I left the direct number to my office for him to call me back.

## 2021-06-17 NOTE — PROGRESS NOTES
ASSESMENT AND PLAN:    Physical, Health Maitenence -   Reviewed healthy lifestyle, diet, exercise, vitamins, and follow-up plan today with patient.  Reviewed age appropriate cancer and other screening recommendations.  Immunization review and update done.  Reviewed indicated lab tests, see lab orders.     -     Glycosylated Hemoglobin A1c  -     LDL Cholesterol, Direct  -     PSA, Annual Screen (Prostatic-Specific Antigen)    Screen for colon cancer  -     Ambulatory referral for Colonoscopy          HPI: 53-year-old male here for his physical.  He has been doing well and does not have any other main concerns.  He is happy with the hip and knee joint replacement surgeries that he has completed.    ROS: Minimal asthma symptoms, he did do an asthma control test today.  He gets good relief with albuterol.  No chest pain, no blood in the urine or blood in the stool, no skin lesions that have been changing, remainder of review of systems is as above are negative.    No past medical history on file.    Current Outpatient Prescriptions   Medication Sig Dispense Refill     albuterol (PROAIR HFA;PROVENTIL HFA;VENTOLIN HFA) 90 mcg/actuation inhaler Inhale 2 puffs every 4 (four) hours as needed for wheezing. 2 Inhaler 6     CALCIUM CARBONATE (CALCIUM 600 ORAL) Take by mouth.       magnesium 30 mg tablet Take 30 mg by mouth 2 (two) times a day.       multivitamin with minerals (THERA-M) 9 mg iron-400 mcg Tab tablet Take 1 tablet by mouth daily.       triamcinolone (KENALOG) 0.1 % cream Apply topically 2 (two) times a day. 80 g 1     No current facility-administered medications for this visit.        Patient Active Problem List   Diagnosis     Hypercholesterolemia     Shortness Of Breath     Joint Pain, Localized In The Shoulder     Asthma, mild intermittent, well-controlled     Osteoarthritis, hip, bilateral     Stress fracture of hip - left     Osteoarthritis of left hip     Degenerative joint disease of knee, right  "      Social History     Social History     Marital status:      Spouse name: N/A     Number of children: N/A     Years of education: N/A     Social History Main Topics     Smoking status: Never Smoker     Smokeless tobacco: Never Used     Alcohol use Yes      Comment: socially     Drug use: Yes     Special: Marijuana     Sexual activity: Yes     Partners: Female     Other Topics Concern     None     Social History Narrative       History   Smoking Status     Never Smoker   Smokeless Tobacco     Never Used       OBJECTICE: /68 (Patient Site: Right Arm, Patient Position: Sitting, Cuff Size: Adult Regular)  Pulse 72  Temp 98.8  F (37.1  C) (Oral)   Resp 20  Ht 5' 9.75\" (1.772 m)  Wt 184 lb (83.5 kg)  BMI 26.59 kg/m2      Gen - alert, orientated, NAD  Eyes - fundascopic exam limited by the undialated pupil but looks symmetric  ENT - oropharynx clear, TMs clear  Neck - supple, no palpable mass or lymphadenopathy  CV - RRR, no murmur  Resp - lungs CTA  Ab - soft, nontender, no palpable mass or organomegaly   - normal appearance to the external genetalia, normal testicular exam bilaterally, no hernia  Extrem - warm, no edema  Neuro - CN II-XII intact, strength, sensation, reflexes intact and symmetric  Skin - no rash, no atypical appearing lesions seen.         Lambert Hinton   2:48 PM 4/11/2018               "

## 2021-06-18 NOTE — PROGRESS NOTES
Preoperative Exam    Scheduled Procedure: Rt Hip total replacement  Surgery Date:  06/06/2018  Surgery Location: [facility]    Surgeon:  Dr. Rai    Assessment/Plan:     Lukas was seen today for pre-op exam.    Preop examination-clear to proceed with surgery and any appropriate anesthesia.  -     Electrocardiogram Perform - Clinic, results below  Continue topical antibiotic ointment therapy prescribed by his orthopedic clinic for the positive staph aureus nasal swab.    Osteoarthritis of right hip  Planned surgical intervention as detailed above.    Asthma, mild intermittent, well-controlled  Continue as needed albuterol.    Neoplasm of uncertain behavior of skin of back  Patient has a dermatologist and will schedule an appointment for sometime in the next few months to get the area of concern of the upper back checked.    Abnormal WBC count (3.1)  Normal hemoglobin and platelets.  Slightly depressed WBC will be rechecked here in the clinic in 6-8 weeks.      Surgical Procedure Risk: Low (reported cardiac risk generally < 1%)  Have you had prior anesthesia?: Yes  Have you or any family members had a previous anesthesia reaction:  No  Do you or any family members have a history of a clotting or bleeding disorder?: Yes: Father  Cardiac Risk Assessment: no increased risk for major cardiac complications    Patient approved for surgery with general or local anesthesia.      Functional Status: Independent  Patient plans to recover at home with family.     Subjective:      Lukas Gautam is a 53 y.o. male who presents for a preoperative consultation.  He has a history of well-controlled intermittent asthma with no recent exacerbation.  Patient has had previous surgeries with general anesthesia and tolerated anesthesia well.  He has had progressive worsening of his right hip pain and it is limiting his day-to-day activities and he is scheduled for right hip surgery as detailed above.    Patient has a skin spot in  his mid upper back that has been itchy for the past year.  No chest pain or shortness of breath.  No blood in the urine or blood in the stool.  All other systems reviewed and are negative, other than those listed in the HPI.    Pertinent History  Do you have difficulty breathing or chest pain after walking up a flight of stairs: No  History of obstructive sleep apnea: No  Steroid use in the last 6 months: No  Frequent Aspirin/NSAID use: No  Prior Blood Transfusion: No  Prior Blood Transfusion Reaction: No  If for some reason prior to, during or after the procedure, if it is medically indicated, would you be willing to have a blood transfusion?:  There is no transfusion refusal.    Current Outpatient Prescriptions   Medication Sig Dispense Refill     albuterol (PROAIR HFA;PROVENTIL HFA;VENTOLIN HFA) 90 mcg/actuation inhaler Inhale 2 puffs every 4 (four) hours as needed for wheezing. 2 Inhaler 6     CALCIUM CARBONATE (CALCIUM 600 ORAL) Take by mouth.       magnesium 30 mg tablet Take 30 mg by mouth 2 (two) times a day.       multivitamin with minerals (THERA-M) 9 mg iron-400 mcg Tab tablet Take 1 tablet by mouth daily.               No current facility-administered medications for this visit.         Allergies   Allergen Reactions     Other Environmental Allergy      Bee Sting       Patient Active Problem List   Diagnosis     Hypercholesterolemia     Shortness Of Breath     Joint Pain, Localized In The Shoulder     Asthma, mild intermittent, well-controlled     Osteoarthritis, hip, bilateral     Stress fracture of hip - left     Osteoarthritis of left hip     Degenerative joint disease of knee, right       No past medical history on file.    No past surgical history on file.    Social History     Social History     Marital status:      Spouse name: N/A     Number of children: N/A     Years of education: N/A     Occupational History     Not on file.     Social History Main Topics     Smoking status: Never Smoker      Smokeless tobacco: Never Used     Alcohol use Yes      Comment: socially     Drug use: Yes     Special: Marijuana     Sexual activity: Yes     Partners: Female     Other Topics Concern     Not on file     Social History Narrative       Patient Care Team:  Lambert Hinton MD as PCP - General          Objective:     Blood pressure 96/64, pulse 61, temperature 98.2, weight 180 pounds, height 5'10'', respiratory rate 16, SpO2 96% on room air      Physical Exam:  Physical Exam  Gen - alert, orientated, NAD  Eyes - fundascopic exam limited by the undialated pupil but looks symmetric  ENT - oropharynx clear, TMs clear  Neck - supple, no palpable mass or lymphadenopathy  CV - RRR, no murmur  Resp - lungs CTA  Ab - soft, nontender, no palpable mass or organomegaly   - normal at recent physical examination earlier this year, not repeated today.  Extrem - warm, no edema  Neuro - CN II-XII intact, strength, sensation, reflexes intact and symmetric  Skin - no rash, small area of slightly thickened and flaking skin with a small area of excoriation in the upper back.  Musculoskeletal-pain with internal and external rotation of the right hip.        Labs:  Done at [facility]  - reviewed, nasal swab positive for staph aureus.  Normal basic metabolic panel.  CBC normal with the exception of a slightly diminished WBC at 3.1.    EKG: Sinus bradycardia, no ischemic changes.     Immunization History   Administered Date(s) Administered     Hep A, historic 11/23/2010, 07/01/2013     Hep B, historic 07/01/2013     Influenza, inj, historic,unspecified 11/23/2010     Influenza, seasonal,quad inj 36+ mos 09/01/2016     Influenza, seasonal,quad inj 6-35 mos 12/09/2014     Tdap 11/23/2010           Electronically signed by Lambert Hinton MD 06/04/18 11:08 AM

## 2021-06-21 NOTE — LETTER
Letter by Lambert Hinton MD at      Author: Lambert Hinton MD Service: -- Author Type: --    Filed:  Encounter Date: 3/29/2021 Status: (Other)               [facility] [place]  [address]  [place] [address]   PHONE [phone]  [phone]     March 29, 2021  Lukas Gautam  [address] [address]    Below are the results from your recent visit.      Resulted Orders   Drugs of Abuse 1,Urine   Result Value Ref Range    Amphetamines Screen Negative Screen Negative    Benzodiazepines Screen Negative Screen Negative    Opiates Screen Negative Screen Negative    Phencyclidine Screen Negative Screen Negative    THC (!) Screen Negative     Screen Positive (Confirmation available on request)    Barbiturates Screen Negative Screen Negative    Cocaine Metabolite Screen Negative Screen Negative    Oxycodone Screen Negative Screen Negative    Creatinine, Urine 74.1 mg/dL    Narrative    Drug                  Screening Threshold    Amphetamines           1000 ng/mL  Benzodiazepine          200 ng/mL  Opiates                 300 ng/mL  Phencyclidine            25 ng/mL  THC Metabolite           50 ng/mL  Barbiturates            200 ng/mL  Cocaine Metabolite      150 ng/mL  Oxycodone               100 ng/mL    Screening results are to be used only for medical purposes.  Unconfirmed screening results must not be used for non-  medical purposes.     If you have any questions or concerns, please do not hesitate to call.    Sincerely,  Lambert Hinton MD  March 29, 2021

## 2021-06-24 NOTE — PROGRESS NOTES
ASSESMENT AND PLAN:  Diagnoses and all orders for this visit:    Local infection of skin and subcutaneous tissue versus other skin lesion  Discussed options to the patient, he has tolerated trimethoprim sulfa well in the past and this would likely cover a MRSA skin infection.  He will take the medication as prescribed below and if he has not had complete resolution of the skin findings over the next 3 weeks follow-up for reevaluation or call me at which time I will arrange for referral to dermatology for further evaluation and treatment.  We also reviewed the differential diagnosis of the itchy skin lesion on his upper back, actinic keratosis is a possibility and I did recommend cryotherapy be considered.  He is going to consider this and if he does end up having to see dermatology for the leg skin lesion he will have them look at this as well.  Reviewed the risks and benefits of the oral antibiotics prescribed below.  -     sulfamethoxazole-trimethoprim (SEPTRA DS) 800-160 mg per tablet  Dispense: 20 tablet; Refill: 0          SUBJECTIVE: 54-year-old male with about a 3-month history of a red raised firm area on his right thigh.  Patient had hip surgery about 6 months ago and has not had any issues or problems around the incisions.  His hip has been pain-free and performing very well.  No fevers or chills.  The area of concern on his right mid thigh anteriorly has been persistent, does not seem to be spreading.  It is in an area where he is experienced numbness since his hip surgery so he cannot say that it has been painful or itchy.  He has also noted a itchy spot on the left upper back.  This is been there for about a year.    No past medical history on file.  Patient Active Problem List   Diagnosis     Hypercholesterolemia     Shortness Of Breath     Joint Pain, Localized In The Shoulder     Asthma, mild intermittent, well-controlled     Osteoarthritis, hip, bilateral     Stress fracture of hip - left      "Osteoarthritis of left hip     Degenerative joint disease of knee, right     Current Outpatient Medications   Medication Sig Dispense Refill     albuterol (PROAIR HFA) 90 mcg/actuation inhaler Inhale 2 puffs every 4 (four) hours as needed for wheezing. 17 g 3     CALCIUM CARBONATE (CALCIUM 600 ORAL) Take by mouth.       magnesium 30 mg tablet Take 30 mg by mouth 2 (two) times a day.       multivitamin with minerals (THERA-M) 9 mg iron-400 mcg Tab tablet Take 1 tablet by mouth daily.       sulfamethoxazole-trimethoprim (SEPTRA DS) 800-160 mg per tablet Take 1 tablet by mouth 2 (two) times a day for 10 days. 20 tablet 0     No current facility-administered medications for this visit.      Social History     Tobacco Use   Smoking Status Never Smoker   Smokeless Tobacco Never Used       OBJECTICE: /60   Pulse 71   Temp 98.1  F (36.7  C) (Oral)   Resp 16   Ht 5' 10\" (1.778 m)   Wt 196 lb (88.9 kg)   SpO2 99%   BMI 28.12 kg/m       No results found for this or any previous visit (from the past 24 hour(s)).     GEN-alert, appropriate, in no apparent distress   SKIN-2 mm raised slightly scaly slightly red skin lesion of the left upper back.  2 clustered skin lesions of the right mid thigh anteriorly, both are maculopapular with micro vesicle formation with some induration, the larger area is 4.5 x 2.5 cm and the smaller area adjacent to it is 1.5 x 1 cm.      Lambert Hinton          "

## 2021-07-13 ENCOUNTER — OFFICE VISIT (OUTPATIENT)
Dept: FAMILY MEDICINE | Facility: CLINIC | Age: 56
End: 2021-07-13
Payer: COMMERCIAL

## 2021-07-13 VITALS
SYSTOLIC BLOOD PRESSURE: 110 MMHG | BODY MASS INDEX: 25.91 KG/M2 | HEIGHT: 70 IN | DIASTOLIC BLOOD PRESSURE: 70 MMHG | OXYGEN SATURATION: 98 % | WEIGHT: 181 LBS | HEART RATE: 56 BPM | RESPIRATION RATE: 20 BRPM | TEMPERATURE: 98 F

## 2021-07-13 DIAGNOSIS — Z00.00 ROUTINE GENERAL MEDICAL EXAMINATION AT HEALTH CARE FACILITY: Primary | ICD-10-CM

## 2021-07-13 LAB
ALBUMIN SERPL-MCNC: 4.3 G/DL (ref 3.5–5)
ALP SERPL-CCNC: 72 U/L (ref 45–120)
ALT SERPL W P-5'-P-CCNC: 27 U/L (ref 0–45)
ANION GAP SERPL CALCULATED.3IONS-SCNC: 15 MMOL/L (ref 5–18)
AST SERPL W P-5'-P-CCNC: 30 U/L (ref 0–40)
BILIRUB SERPL-MCNC: 0.8 MG/DL (ref 0–1)
BUN SERPL-MCNC: 14 MG/DL (ref 8–22)
CALCIUM SERPL-MCNC: 9.8 MG/DL (ref 8.5–10.5)
CHLORIDE BLD-SCNC: 103 MMOL/L (ref 98–107)
CO2 SERPL-SCNC: 22 MMOL/L (ref 22–31)
CREAT SERPL-MCNC: 0.77 MG/DL (ref 0.7–1.3)
GFR SERPL CREATININE-BSD FRML MDRD: >90 ML/MIN/1.73M2
GLUCOSE BLD-MCNC: 93 MG/DL (ref 70–125)
LDLC SERPL CALC-MCNC: 116 MG/DL
POTASSIUM BLD-SCNC: 4.7 MMOL/L (ref 3.5–5)
PROT SERPL-MCNC: 7.7 G/DL (ref 6–8)
PSA SERPL-MCNC: 2.79 UG/L (ref 0–3.5)
SODIUM SERPL-SCNC: 140 MMOL/L (ref 136–145)

## 2021-07-13 PROCEDURE — 36415 COLL VENOUS BLD VENIPUNCTURE: CPT | Performed by: FAMILY MEDICINE

## 2021-07-13 PROCEDURE — G0103 PSA SCREENING: HCPCS | Performed by: FAMILY MEDICINE

## 2021-07-13 PROCEDURE — 90715 TDAP VACCINE 7 YRS/> IM: CPT | Performed by: FAMILY MEDICINE

## 2021-07-13 PROCEDURE — 83721 ASSAY OF BLOOD LIPOPROTEIN: CPT | Performed by: FAMILY MEDICINE

## 2021-07-13 PROCEDURE — 90471 IMMUNIZATION ADMIN: CPT | Performed by: FAMILY MEDICINE

## 2021-07-13 PROCEDURE — 99396 PREV VISIT EST AGE 40-64: CPT | Mod: 25 | Performed by: FAMILY MEDICINE

## 2021-07-13 PROCEDURE — 80053 COMPREHEN METABOLIC PANEL: CPT | Performed by: FAMILY MEDICINE

## 2021-07-13 RX ORDER — PHENOL 1.4 %
10 AEROSOL, SPRAY (ML) MUCOUS MEMBRANE
COMMUNITY
End: 2021-11-15

## 2021-07-13 ASSESSMENT — MIFFLIN-ST. JEOR: SCORE: 1663.29

## 2021-07-13 NOTE — PROGRESS NOTES
ASSESMENT AND PLAN:    Physical, Health Maitenence -   Reviewed healthy lifestyle, diet, exercise, vitamins, and follow-up plan today with patient.  Reviewed age appropriate cancer and other screening recommendations.  Immunization review and update done.  Reviewed indicated lab tests, see lab orders.   Counseling done on healthy approach to dealing with the stress of his divorce.    -     TDAP VACCINE (Adacel, Boostrix)  [[id number]]  -     Comprehensive metabolic panel (BMP + Alb, Alk Phos, ALT, AST, Total. Bili, TP); Future  -     LDL cholesterol direct; Future  -     PSA, screen; Future        HPI: 56 old male here for his annual checkup and physical.  He does not have any other health concerns.  He does note that he has been going through a divorce and this has been difficult for him and his family.  He feels like he is adjusting well and there has been some finalization that has occurred with mediation recently that makes him feel better about things.  He is sleeping well.  He does not feel like there is been any problems with depressed mood.  He is exercising regularly.  Patient has a past history of bronchospasm but reports that he has not had to use his albuterol at all or have any issues with his breathing over this past year.  In fact the last time that he got albuterol filled was in 2018.      ROS:No exertional chest pain.  No shortness of breath.  No blood in the urine or blood in the stool.  No skin lesions that have been changing.  Remainder of review of systems is as above or negative.      No past medical history on file.    Current Outpatient Medications   Medication Sig Dispense Refill     BIOTIN PO Take 1 tablet by mouth daily       Lactobacillus (PROBIOTIC ACIDOPHILUS PO) Take 1 capsule by mouth daily       magnesium 30 mg tablet [MAGNESIUM 30 MG TABLET] Take 30 mg by mouth 2 (two) times a day.       Melatonin 10 MG TABS tablet Take 10 mg by mouth nightly as needed for sleep       multivitamin  with minerals (THERA-M) 9 mg iron-400 mcg Tab tablet [MULTIVITAMIN WITH MINERALS (THERA-M) 9 MG IRON-400 MCG TAB TABLET] Take 1 tablet by mouth daily.       CALCIUM CARBONATE (CALCIUM 600 ORAL) [CALCIUM CARBONATE (CALCIUM 600 ORAL)] Take by mouth.         Patient Active Problem List   Diagnosis     Aftercare following right knee joint replacement surgery     Left-sided low back pain with left-sided sciatica, unspecified chronicity       Social History     Socioeconomic History     Marital status:      Spouse name: None     Number of children: None     Years of education: None     Highest education level: None   Occupational History     None   Tobacco Use     Smoking status: Never Smoker     Smokeless tobacco: Never Used   Substance and Sexual Activity     Alcohol use: Yes     Comment: Alcoholic Drinks/day: socially     Drug use: Yes     Types: Marijuana     Sexual activity: Yes     Partners: Female   Other Topics Concern     None   Social History Narrative     None     Social Determinants of Health     Financial Resource Strain:      Difficulty of Paying Living Expenses:    Food Insecurity:      Worried About Running Out of Food in the Last Year:      Ran Out of Food in the Last Year:    Transportation Needs:      Lack of Transportation (Medical):      Lack of Transportation (Non-Medical):    Physical Activity:      Days of Exercise per Week:      Minutes of Exercise per Session:    Stress:      Feeling of Stress :    Social Connections:      Frequency of Communication with Friends and Family:      Frequency of Social Gatherings with Friends and Family:      Attends Synagogue Services:      Active Member of Clubs or Organizations:      Attends Club or Organization Meetings:      Marital Status:    Intimate Partner Violence:      Fear of Current or Ex-Partner:      Emotionally Abused:      Physically Abused:      Sexually Abused:        History   Smoking Status     Never Smoker   Smokeless Tobacco     Never  "Used       OBJECTICE: /70 (BP Location: Right arm)   Pulse 56   Temp 98  F (36.7  C) (Oral)   Resp 20   Ht 1.772 m (5' 9.75\")   Wt 82.1 kg (181 lb)   SpO2 98%   BMI 26.16 kg/m        Gen - alert, orientated, NAD  Eyes - fundascopic exam limited by the undialated pupil but looks symmetric  ENT - oropharynx clear, TMs clear  Neck - supple, no palpable mass or lymphadenopathy  CV - RRR, no murmur  Resp - lungs CTA  Ab - soft, nontender, no palpable mass or organomegaly   - normal appearance to the external genetalia, normal testicular exam bilaterally, no hernia  Extrem - warm, no edema  Neuro - CN II-XII intact, strength, sensation, reflexes intact and symmetric  Skin - no rash, no atypical appearing lesions seen.         Lambert Hinton MD   2:26 PM 7/13/2021             Answers for HPI/ROS submitted by the patient on 7/13/2021  Frequency of exercise:: 6-7 days/week  Getting at least 3 servings of Calcium per day:: Yes  Diet:: Regular (no restrictions), Vegetarian/vegan  Taking medications regularly:: No  Medication side effects:: Not applicable  Bi-annual eye exam:: Yes  Dental care twice a year:: Yes  Sleep apnea or symptoms of sleep apnea:: None  Additional concerns today:: No  Duration of exercise:: Greater than 60 minutes  Barriers to taking medications:: None      "

## 2021-07-13 NOTE — LETTER
July 14, 2021      Lukas Gautam  [address] [address]        Dear Lukas,    We are writing to inform you of your test results.  All normal.        Resulted Orders   Comprehensive metabolic panel (BMP + Alb, Alk Phos, ALT, AST, Total. Bili, TP)   Result Value Ref Range    Sodium 140 136 - 145 mmol/L    Potassium 4.7 3.5 - 5.0 mmol/L    Chloride 103 98 - 107 mmol/L    Carbon Dioxide (CO2) 22 22 - 31 mmol/L    Anion Gap 15 5 - 18 mmol/L    Urea Nitrogen 14 8 - 22 mg/dL    Creatinine 0.77 0.70 - 1.30 mg/dL    Calcium 9.8 8.5 - 10.5 mg/dL    Glucose 93 70 - 125 mg/dL    Alkaline Phosphatase 72 45 - 120 U/L    AST 30 0 - 40 U/L    ALT 27 0 - 45 U/L    Protein Total 7.7 6.0 - 8.0 g/dL    Albumin 4.3 3.5 - 5.0 g/dL    Bilirubin Total 0.8 0.0 - 1.0 mg/dL    GFR Estimate >90 >60 mL/min/1.73m2      Comment:      As of July 11, 2021, eGFR is calculated by the CKD-EPI creatinine equation, without race adjustment. eGFR can be influenced by muscle mass, exercise, and diet. The reported eGFR is an estimation only and is only applicable if the renal function is stable.   LDL cholesterol direct   Result Value Ref Range    LDL Cholesterol Direct 116 <=129 mg/dL   PSA, screen   Result Value Ref Range    Prostate Specific Antigen Screen 2.79 0.00 - 3.50 ug/L       If you have any questions or concerns, please call the clinic at the number listed above.       Sincerely,      Lambert Hinton MD

## 2021-09-01 ENCOUNTER — TRANSFERRED RECORDS (OUTPATIENT)
Dept: HEALTH INFORMATION MANAGEMENT | Facility: CLINIC | Age: 56
End: 2021-09-01

## 2021-09-01 LAB
CREATININE (EXTERNAL): 0.76 MG/DL (ref 0.72–1.25)
GFR ESTIMATED (EXTERNAL): >60 ML/MIN/1.73M2
GFR ESTIMATED (IF AFRICAN AMERICAN) (EXTERNAL): >60 ML/MIN/1.73M2
GLUCOSE (EXTERNAL): 109 MG/DL (ref 65–100)
INR (EXTERNAL): 1
POTASSIUM (EXTERNAL): 3.8 MMOL/L (ref 3.5–5)

## 2021-09-02 LAB
CREATININE (EXTERNAL): 0.7 MG/DL (ref 0.73–1.18)
GFR ESTIMATED (EXTERNAL): >60 ML/MIN/1.73M2
GFR ESTIMATED (IF AFRICAN AMERICAN) (EXTERNAL): >60 ML/MIN/1.73M2
GLUCOSE (EXTERNAL): 127 MG/DL (ref 70–100)
POTASSIUM (EXTERNAL): 4.5 MMOL/L (ref 3.5–5.1)

## 2021-09-20 DIAGNOSIS — R52 PAIN: Primary | ICD-10-CM

## 2021-09-20 RX ORDER — GABAPENTIN 300 MG/1
300 CAPSULE ORAL EVERY 8 HOURS PRN
Qty: 60 CAPSULE | Refills: 1 | Status: SHIPPED | OUTPATIENT
Start: 2021-09-20 | End: 2022-04-04
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
## 2021-10-23 ENCOUNTER — HEALTH MAINTENANCE LETTER (OUTPATIENT)
Age: 56
End: 2021-10-23

## 2021-11-15 DIAGNOSIS — G47.00 INSOMNIA, UNSPECIFIED TYPE: ICD-10-CM

## 2021-11-15 DIAGNOSIS — G89.21 CHRONIC PAIN DUE TO TRAUMA: Primary | ICD-10-CM
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
## 2021-11-19 ENCOUNTER — TRANSFERRED RECORDS (OUTPATIENT)
Dept: HEALTH INFORMATION MANAGEMENT | Facility: CLINIC | Age: 56
End: 2021-11-19
Payer: COMMERCIAL

## 2021-11-30 DIAGNOSIS — J45.20 ASTHMA, MILD INTERMITTENT, WELL-CONTROLLED: ICD-10-CM

## 2021-11-30 RX ORDER — ALBUTEROL SULFATE 90 UG/1
2 AEROSOL, METERED RESPIRATORY (INHALATION) EVERY 4 HOURS PRN
Qty: 17 G | Refills: 3 | Status: SHIPPED | OUTPATIENT
Start: 2021-11-30

## 2022-03-14 NOTE — TELEPHONE ENCOUNTER
Due to patient being non-English speaking/uses sign language, an  was used for this visit. Only for face-to-face interpretation by an external agency, date and length of interpretation can be found on the scanned worksheet.     name: Aldo ([id number])  Agency: [employer]  Language: Germaine   Telephone number: [phone]  Type of interpretation: Telephone, spoken       Order entered

## 2022-04-04 NOTE — PROGRESS NOTES
Patient had called wanting to update on his chronic neck issues and concerns about his mood.  Patient reports that he is making slow continued progress with his neck pain and range of motion.  He was cautioned to be very careful about avoiding any activities that could retraumatize the area.  Patient feels like his mood has stabilized and he is not interested in medication at this time.  We discussed indications for follow-up, if everything is going well he will just follow-up for a physical in the fall, sooner if needed.

## 2022-10-10 ENCOUNTER — HEALTH MAINTENANCE LETTER (OUTPATIENT)
Age: 57
End: 2022-10-10

## 2023-01-05 ENCOUNTER — OFFICE VISIT (OUTPATIENT)
Dept: FAMILY MEDICINE | Facility: CLINIC | Age: 58
End: 2023-01-05
Payer: COMMERCIAL

## 2023-01-05 VITALS
DIASTOLIC BLOOD PRESSURE: 74 MMHG | TEMPERATURE: 97.8 F | HEIGHT: 69 IN | OXYGEN SATURATION: 97 % | SYSTOLIC BLOOD PRESSURE: 120 MMHG | WEIGHT: 187 LBS | BODY MASS INDEX: 27.7 KG/M2 | HEART RATE: 51 BPM

## 2023-01-05 DIAGNOSIS — N50.89 SCROTAL MASS: Primary | ICD-10-CM

## 2023-01-05 DIAGNOSIS — Z87.81 HISTORY OF SPINAL FRACTURE: ICD-10-CM

## 2023-01-05 DIAGNOSIS — G56.03 BILATERAL CARPAL TUNNEL SYNDROME: ICD-10-CM

## 2023-01-05 DIAGNOSIS — Z23 HIGH PRIORITY FOR 2019 NOVEL CORONAVIRUS VACCINATION: ICD-10-CM

## 2023-01-05 DIAGNOSIS — F90.0 ATTENTION DEFICIT HYPERACTIVITY DISORDER (ADHD), PREDOMINANTLY INATTENTIVE TYPE: ICD-10-CM

## 2023-01-05 DIAGNOSIS — Z23 NEED FOR IMMUNIZATION AGAINST INFLUENZA: ICD-10-CM

## 2023-01-05 DIAGNOSIS — Z13.220 SCREENING FOR HYPERLIPIDEMIA: ICD-10-CM

## 2023-01-05 DIAGNOSIS — Z12.5 SCREENING FOR PROSTATE CANCER: ICD-10-CM

## 2023-01-05 DIAGNOSIS — R73.9 HYPERGLYCEMIA: ICD-10-CM

## 2023-01-05 LAB
CHOLEST SERPL-MCNC: 211 MG/DL
HBA1C MFR BLD: 5.3 % (ref 0–5.6)
HDLC SERPL-MCNC: 64 MG/DL
LDLC SERPL CALC-MCNC: 129 MG/DL
NONHDLC SERPL-MCNC: 147 MG/DL
PSA SERPL-MCNC: 1.91 NG/ML (ref 0–3.5)
TRIGL SERPL-MCNC: 88 MG/DL

## 2023-01-05 PROCEDURE — 99214 OFFICE O/P EST MOD 30 MIN: CPT | Mod: 25 | Performed by: FAMILY MEDICINE

## 2023-01-05 PROCEDURE — G0103 PSA SCREENING: HCPCS | Performed by: FAMILY MEDICINE

## 2023-01-05 PROCEDURE — 0134A COVID-19 VACCINE BIVALENT BOOSTER 18+ (MODERNA): CPT | Performed by: FAMILY MEDICINE

## 2023-01-05 PROCEDURE — 91313 COVID-19 VACCINE BIVALENT BOOSTER 18+ (MODERNA): CPT | Performed by: FAMILY MEDICINE

## 2023-01-05 PROCEDURE — 90682 RIV4 VACC RECOMBINANT DNA IM: CPT | Performed by: FAMILY MEDICINE

## 2023-01-05 PROCEDURE — 80061 LIPID PANEL: CPT | Performed by: FAMILY MEDICINE

## 2023-01-05 PROCEDURE — 83036 HEMOGLOBIN GLYCOSYLATED A1C: CPT | Performed by: FAMILY MEDICINE

## 2023-01-05 PROCEDURE — 36415 COLL VENOUS BLD VENIPUNCTURE: CPT | Performed by: FAMILY MEDICINE

## 2023-01-05 PROCEDURE — 90471 IMMUNIZATION ADMIN: CPT | Performed by: FAMILY MEDICINE

## 2023-01-05 ASSESSMENT — ASTHMA QUESTIONNAIRES
QUESTION_2 LAST FOUR WEEKS HOW OFTEN HAVE YOU HAD SHORTNESS OF BREATH: NOT AT ALL
QUESTION_1 LAST FOUR WEEKS HOW MUCH OF THE TIME DID YOUR ASTHMA KEEP YOU FROM GETTING AS MUCH DONE AT WORK, SCHOOL OR AT HOME: NONE OF THE TIME
QUESTION_5 LAST FOUR WEEKS HOW WOULD YOU RATE YOUR ASTHMA CONTROL: COMPLETELY CONTROLLED
QUESTION_3 LAST FOUR WEEKS HOW OFTEN DID YOUR ASTHMA SYMPTOMS (WHEEZING, COUGHING, SHORTNESS OF BREATH, CHEST TIGHTNESS OR PAIN) WAKE YOU UP AT NIGHT OR EARLIER THAN USUAL IN THE MORNING: NOT AT ALL
ACT_TOTALSCORE: 25
ACT_TOTALSCORE: 25
QUESTION_4 LAST FOUR WEEKS HOW OFTEN HAVE YOU USED YOUR RESCUE INHALER OR NEBULIZER MEDICATION (SUCH AS ALBUTEROL): NOT AT ALL

## 2023-01-05 NOTE — LETTER
January 10, 2023      Lukas Gautam  [address] [facility] [address]        Dear ,    We are writing to inform you of your test results.  Normal prostate cancer screening test, normal A1c which means no diabetes or prediabetes, mild elevation in cholesterol is improved compared to last time it was checked.    Resulted Orders   PSA, screen   Result Value Ref Range    Prostate Specific Antigen Screen 1.91 0.00 - 3.50 ng/mL    Narrative    This result is obtained using the Roche Elecsys total PSA method on the amado e801 immunoassay analyzer. Results obtained with different assay methods or kits cannot be used interchangeably.   Hemoglobin A1c   Result Value Ref Range    Hemoglobin A1C 5.3 0.0 - 5.6 %      Comment:      Normal <5.7%   Prediabetes 5.7-6.4%    Diabetes 6.5% or higher     Note: Adopted from ADA consensus guidelines.   Lipid panel reflex to direct LDL Fasting   Result Value Ref Range    Cholesterol 211 (H) <200 mg/dL    Triglycerides 88 <150 mg/dL    Direct Measure HDL 64 >=40 mg/dL    LDL Cholesterol Calculated 129 (H) <=100 mg/dL    Non HDL Cholesterol 147 (H) <130 mg/dL    Narrative    Cholesterol  Desirable:  <200 mg/dL    Triglycerides  Normal:  Less than 150 mg/dL  Borderline High:  150-199 mg/dL  High:  200-499 mg/dL  Very High:  Greater than or equal to 500 mg/dL    Direct Measure HDL  Female:  Greater than or equal to 50 mg/dL   Male:  Greater than or equal to 40 mg/dL    LDL Cholesterol  Desirable:  <100mg/dL  Above Desirable:  100-129 mg/dL   Borderline High:  130-159 mg/dL   High:  160-189 mg/dL   Very High:  >= 190 mg/dL    Non HDL Cholesterol  Desirable:  130 mg/dL  Above Desirable:  130-159 mg/dL  Borderline High:  160-189 mg/dL  High:  190-219 mg/dL  Very High:  Greater than or equal to 220 mg/dL       If you have any questions or concerns, please call the clinic at the number listed above.       Sincerely,      Lambert Hinton MD
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
## 2023-02-03 ENCOUNTER — ANCILLARY PROCEDURE (OUTPATIENT)
Dept: ULTRASOUND IMAGING | Facility: CLINIC | Age: 58
End: 2023-02-03
Attending: FAMILY MEDICINE
Payer: COMMERCIAL

## 2023-02-03 DIAGNOSIS — N50.89 SCROTAL MASS: ICD-10-CM

## 2023-02-03 PROCEDURE — 76870 US EXAM SCROTUM: CPT | Mod: GC | Performed by: RADIOLOGY

## 2023-05-02 ASSESSMENT — ANXIETY QUESTIONNAIRES
6. BECOMING EASILY ANNOYED OR IRRITABLE: SEVERAL DAYS
3. WORRYING TOO MUCH ABOUT DIFFERENT THINGS: SEVERAL DAYS
GAD7 TOTAL SCORE: 7
GAD7 TOTAL SCORE: 7
2. NOT BEING ABLE TO STOP OR CONTROL WORRYING: SEVERAL DAYS
5. BEING SO RESTLESS THAT IT IS HARD TO SIT STILL: SEVERAL DAYS
7. FEELING AFRAID AS IF SOMETHING AWFUL MIGHT HAPPEN: SEVERAL DAYS
IF YOU CHECKED OFF ANY PROBLEMS ON THIS QUESTIONNAIRE, HOW DIFFICULT HAVE THESE PROBLEMS MADE IT FOR YOU TO DO YOUR WORK, TAKE CARE OF THINGS AT HOME, OR GET ALONG WITH OTHER PEOPLE: SOMEWHAT DIFFICULT
7. FEELING AFRAID AS IF SOMETHING AWFUL MIGHT HAPPEN: SEVERAL DAYS
8. IF YOU CHECKED OFF ANY PROBLEMS, HOW DIFFICULT HAVE THESE MADE IT FOR YOU TO DO YOUR WORK, TAKE CARE OF THINGS AT HOME, OR GET ALONG WITH OTHER PEOPLE?: SOMEWHAT DIFFICULT
1. FEELING NERVOUS, ANXIOUS, OR ON EDGE: SEVERAL DAYS
4. TROUBLE RELAXING: SEVERAL DAYS
GAD7 TOTAL SCORE: 7

## 2023-05-02 ASSESSMENT — PATIENT HEALTH QUESTIONNAIRE - PHQ9
SUM OF ALL RESPONSES TO PHQ QUESTIONS 1-9: 1
10. IF YOU CHECKED OFF ANY PROBLEMS, HOW DIFFICULT HAVE THESE PROBLEMS MADE IT FOR YOU TO DO YOUR WORK, TAKE CARE OF THINGS AT HOME, OR GET ALONG WITH OTHER PEOPLE: NOT DIFFICULT AT ALL
SUM OF ALL RESPONSES TO PHQ QUESTIONS 1-9: 1

## 2023-05-03 ENCOUNTER — TELEPHONE (OUTPATIENT)
Dept: BEHAVIORAL HEALTH | Facility: CLINIC | Age: 58
End: 2023-05-03

## 2023-05-03 ENCOUNTER — VIRTUAL VISIT (OUTPATIENT)
Dept: PSYCHOLOGY | Facility: CLINIC | Age: 58
End: 2023-05-03
Payer: COMMERCIAL

## 2023-05-03 DIAGNOSIS — F90.0 ATTENTION DEFICIT HYPERACTIVITY DISORDER (ADHD), PREDOMINANTLY INATTENTIVE TYPE: ICD-10-CM

## 2023-05-03 PROCEDURE — 99207 PR NO BILLABLE SERVICE THIS VISIT: CPT | Mod: VID | Performed by: MARRIAGE & FAMILY THERAPIST

## 2023-05-03 NOTE — PROGRESS NOTES
Met briefly with patient.  In discussing purpose of visit (psychotherapy) with patient, patient stated that was looking for referral for psychiatry/medication evaluation and not psychotherapy.  Encouraged patient to call Appointment Line for scheduling with psychiatry and to reach out to his PCP if needed for additional referral.    Jose Farias M.A., FT 5/3/23

## 2023-05-03 NOTE — TELEPHONE ENCOUNTER
"5/3/2023 Patient called after a brief meeting with Jose MERRILL. Patient reports he was supposed to be scheduled with psychiatry for medication management not therapy. Writer looked and the referral dated 1/5/2023 from Lambert Hinton states:    Psychotherapy/Counseling (non-medication)          Reason for Referral:  Individual Psychotherapy/Counseling          My clinical question is:  None medication management of ADHD    Writer informed caller the referral was placed for therapy not psychiatry. Patient asked how that could be when he was \"very specific\" with referring provider. Writer informed patient he could not speak for referring provider. Writer educated patient on all psychiatry options; LT, CCPS and CC. Patient told writer he should not be billed for initial appointment since it was an error. Writer directed patient to his referring provider as the appointment was scheduled properly based on the referral type. Writer cancelled therapy follow up per patients request.  "

## 2023-05-03 NOTE — Clinical Note
Deandre, Dr. Hinton.  I met with Lukas today for scheduled Diagnostic Assessment/therapy intake.  In discussing purpose of visit with him, he reported that he was interested in referral for psychiatry/medication management and not psychotherapy, so I directed him to call to attempt to schedule this, and advised that he may need to follow-up with you for proper referral, fyi.  Hope this helps!

## 2023-05-10 ENCOUNTER — DOCUMENTATION ONLY (OUTPATIENT)
Dept: FAMILY MEDICINE | Facility: CLINIC | Age: 58
End: 2023-05-10
Payer: COMMERCIAL

## 2023-05-10 DIAGNOSIS — F98.8 ADD (ATTENTION DEFICIT DISORDER) WITHOUT HYPERACTIVITY: Primary | ICD-10-CM

## 2023-05-10 RX ORDER — BUPROPION HYDROCHLORIDE 150 MG/1
150 TABLET ORAL EVERY MORNING
Qty: 30 TABLET | Refills: 6 | Status: SHIPPED | OUTPATIENT
Start: 2023-05-10 | End: 2023-07-10

## 2023-05-10 NOTE — PROGRESS NOTES
Called and discussed with the patient over the telephone his concern.  See my previous clinic note for details, he had been referred for what I was hoping would be a diagnostic assessment as well as medication management for potential attention deficit disorder.  However, there was confusion around the referral and the patient reports that he was scheduled with a marriage and family therapist.    Over the last few months the patient feels like his attention and focus problems have become more and more of a problem in his daily life.  I discussed with him options again today lane the telephone including re-referral versus continued attempts at managing the attention and focus problems without medication versus trialing of a medication.  Patient would like to trial medication.  Because of the patient's age and related cardiovascular risks I did not recommend a stimulant, we discussed nonstimulant options and we are going to trial bupropion extended release 150 mg every morning.  We had a discussion today about the risks and benefits of the medication, I like to have him follow-up with me either in person or [facility] or by phone in 3 to 4 weeks for an update on how things are going, sooner if problems.

## 2023-07-10 DIAGNOSIS — F98.8 ADD (ATTENTION DEFICIT DISORDER) WITHOUT HYPERACTIVITY: ICD-10-CM

## 2023-07-10 RX ORDER — BUPROPION HYDROCHLORIDE 300 MG/1
300 TABLET ORAL EVERY MORNING
Qty: 90 TABLET | Refills: 3 | Status: SHIPPED | OUTPATIENT
Start: 2023-07-10

## 2023-07-10 NOTE — PROGRESS NOTES
Telephone message from the patient indicating that he has noticed some good improvement in his ADD symptoms with bupropion, tolerating the medication well, would like to go up to 300 mg.  Prescription sent to his pharmacy.

## 2023-10-29 ENCOUNTER — HEALTH MAINTENANCE LETTER (OUTPATIENT)
Age: 58
End: 2023-10-29

## 2024-01-17 DIAGNOSIS — J98.8 RESPIRATORY INFECTION: Primary | ICD-10-CM

## 2024-01-17 RX ORDER — AZITHROMYCIN 250 MG/1
TABLET, FILM COATED ORAL
Qty: 6 TABLET | Refills: 0 | Status: SHIPPED | OUTPATIENT
Start: 2024-01-17 | End: 2024-01-22

## 2024-01-17 NOTE — PROGRESS NOTES
Patient called last week with respiratory symptoms including productive cough.  He used over-the-counter medications over the last several days and has had persistent issues with productive cough and tightness in his breathing.  He does have a history of mild intermittent asthma symptoms.  He would like to use a course of antibiotics which was prescribed with azithromycin.  Follow-up if not improving or if problems.

## 2024-12-21 ENCOUNTER — HEALTH MAINTENANCE LETTER (OUTPATIENT)
Age: 59
End: 2024-12-21

## 2025-01-24 ENCOUNTER — OFFICE VISIT (OUTPATIENT)
Dept: FAMILY MEDICINE | Facility: CLINIC | Age: 60
End: 2025-01-24
Payer: COMMERCIAL

## 2025-01-24 VITALS
BODY MASS INDEX: 27.36 KG/M2 | WEIGHT: 191.12 LBS | OXYGEN SATURATION: 98 % | HEART RATE: 83 BPM | HEIGHT: 70 IN | DIASTOLIC BLOOD PRESSURE: 83 MMHG | TEMPERATURE: 97.8 F | SYSTOLIC BLOOD PRESSURE: 131 MMHG | RESPIRATION RATE: 16 BRPM

## 2025-01-24 DIAGNOSIS — L30.9 DERMATITIS: Primary | ICD-10-CM

## 2025-01-24 PROCEDURE — 99214 OFFICE O/P EST MOD 30 MIN: CPT | Performed by: FAMILY MEDICINE

## 2025-01-24 RX ORDER — TRIAMCINOLONE ACETONIDE 1 MG/G
CREAM TOPICAL 2 TIMES DAILY
Qty: 80 G | Refills: 1 | Status: SHIPPED | OUTPATIENT
Start: 2025-01-24

## 2025-01-24 RX ORDER — DOXYCYCLINE 100 MG/1
100 CAPSULE ORAL 2 TIMES DAILY
Qty: 20 CAPSULE | Refills: 0 | Status: SHIPPED | OUTPATIENT
Start: 2025-01-24 | End: 2025-02-03

## 2025-01-24 NOTE — PROGRESS NOTES
"  Assessment & Plan     Dermatitis  Rash of uncertain etiology.  After some investigation, I wonder about [place] Disease (transient acantholytic ??rm?t?si?).  Do wonder about bacterial given prolonged exposure to hot temp (sauna and hottub).  Also could be eczema.At the time of visit, elected trial of combined approach of oral antibiotic (in case of bacterial folliculitis) and topic steroid.  Would also aggressive moisturize after every time in the water (after every shower).  If not improving substantially, would see derm.  - doxycycline hyclate (VIBRAMYCIN) 100 MG capsule  Dispense: 20 capsule; Refill: 0  - triamcinolone (KENALOG) 0.1 % external cream  Dispense: 80 g; Refill: 1  - Adult Dermatology [facility] Referral          Subjective   Lukas is a 59 year old, presenting for the following health issues:  Derm Problem (Rash around back and torso area, usually gets itchy around the winter time. Patient suspects its related to using the sauna and cold plunge tub often. Symptoms have gotten worse and irritated. )  Also uses home hot tub  Stays in sauna a long time (40min+)    History of Present Illness       Reason for visit:  Back rash  Symptom onset:  More than a month  Symptoms include:  Back rash itchy  Symptom intensity:  Moderate  Symptom progression:  Worsening  Had these symptoms before:  Yes  Has tried/received treatment for these symptoms:  No  What makes it worse:  Scatching  What makes it better:  Ignoring or being busy   He is taking medications regularly.     Started on back, now on chest and legs.  Occasionally uses lotion  Very pruritic  Tried calamine lotion - just made it dry  Worsening over a period of months; has occurred in past years as well but not this bad                  Objective    /83   Pulse 83   Temp 97.8  F (36.6  C) (Oral)   Resp 16   Ht 1.767 m (5' 9.57\")   Wt 86.7 kg (191 lb 1.9 oz)   SpO2 98%   BMI 27.77 kg/m    Body mass index is 27.77 kg/m .  Physical Exam     Gen:  " Awake and alert, no acute distress.   Skin:  Skin is quite dry throughout.  Back is covered in dry rough skin with erythematous base and excoriated nonpustular slightly raised pink papules.  Similar but less dense areas the chest and a few on the legs as well.  Some but definitely not all papules are at hair follicles.  (Couldn't get camera to work today)          Signed Electronically by: Vera Rand MD

## 2025-02-26 ENCOUNTER — OFFICE VISIT (OUTPATIENT)
Dept: FAMILY MEDICINE | Facility: CLINIC | Age: 60
End: 2025-02-26
Payer: COMMERCIAL

## 2025-02-26 VITALS
DIASTOLIC BLOOD PRESSURE: 86 MMHG | WEIGHT: 191.12 LBS | TEMPERATURE: 97.8 F | SYSTOLIC BLOOD PRESSURE: 135 MMHG | HEART RATE: 63 BPM | OXYGEN SATURATION: 99 % | RESPIRATION RATE: 16 BRPM | BODY MASS INDEX: 28.31 KG/M2 | HEIGHT: 69 IN

## 2025-02-26 DIAGNOSIS — Z00.00 ROUTINE GENERAL MEDICAL EXAMINATION AT HEALTH CARE FACILITY: Primary | ICD-10-CM

## 2025-02-26 LAB
ANION GAP SERPL CALCULATED.3IONS-SCNC: 12 MMOL/L (ref 7–15)
BUN SERPL-MCNC: 12.7 MG/DL (ref 6–20)
CALCIUM SERPL-MCNC: 9.9 MG/DL (ref 8.8–10.4)
CHLORIDE SERPL-SCNC: 100 MMOL/L (ref 98–107)
CREAT SERPL-MCNC: 0.74 MG/DL (ref 0.67–1.17)
EGFRCR SERPLBLD CKD-EPI 2021: >90 ML/MIN/1.73M2
EST. AVERAGE GLUCOSE BLD GHB EST-MCNC: 105 MG/DL
GLUCOSE SERPL-MCNC: 98 MG/DL (ref 70–99)
HBA1C MFR BLD: 5.3 % (ref 0–5.6)
HCO3 SERPL-SCNC: 26 MMOL/L (ref 22–29)
POTASSIUM SERPL-SCNC: 4.8 MMOL/L (ref 3.4–5.3)
PSA SERPL DL<=0.01 NG/ML-MCNC: 2.83 NG/ML (ref 0–3.5)
SODIUM SERPL-SCNC: 138 MMOL/L (ref 135–145)
T PALLIDUM AB SER QL: NONREACTIVE

## 2025-02-26 SDOH — HEALTH STABILITY: PHYSICAL HEALTH: ON AVERAGE, HOW MANY MINUTES DO YOU ENGAGE IN EXERCISE AT THIS LEVEL?: 60 MIN

## 2025-02-26 SDOH — HEALTH STABILITY: PHYSICAL HEALTH: ON AVERAGE, HOW MANY DAYS PER WEEK DO YOU ENGAGE IN MODERATE TO STRENUOUS EXERCISE (LIKE A BRISK WALK)?: 5 DAYS

## 2025-02-26 ASSESSMENT — SOCIAL DETERMINANTS OF HEALTH (SDOH): HOW OFTEN DO YOU GET TOGETHER WITH FRIENDS OR RELATIVES?: MORE THAN THREE TIMES A WEEK

## 2025-02-26 NOTE — PROGRESS NOTES
Preventive Care Visit  [facility] PALMER Hinton MD, Family Medicine  Feb 26, 2025      ASSESMENT AND PLAN:    Physical, Health [place] - Routine general medical examination at health care facility  Reviewed healthy lifestyle, diet, exercise, vitamins, and follow-up plan today with patient.  Reviewed age appropriate cancer and other screening recommendations.  Immunization review and update done.  Reviewed indicated lab tests, see lab orders.   -     Basic metabolic panel  (Ca, Cl, CO2, Creat, Gluc, K, Na, BUN); Future  -     Hemoglobin A1c; Future  -     PSA, screen; Future  -     HIV Antigen Antibody Combo; Future  -     Treponema Abs w Reflex to RPR and Titer; Future        HPI: Patient is in for his physical and preventative visit, no other concerns.    ROS: The rash that he had been treated for recently here in the clinic has 90% improved.  No chest pain, no shortness of breath, no blood in the urine, no blood in the stool, remainder of review of systems is as above or negative.    No past medical history on file.    Current Outpatient Medications   Medication Sig Dispense Refill    albuterol (PROAIR HFA/PROVENTIL HFA/VENTOLIN HFA) 108 (90 Base) MCG/ACT inhaler Inhale 2 puffs into the lungs every 4 hours as needed 17 g 3    Lactobacillus (PROBIOTIC ACIDOPHILUS PO) Take 1 capsule by mouth daily         Patient Active Problem List   Diagnosis    Aftercare following right knee joint replacement surgery    Left-sided low back pain with left-sided sciatica, unspecified chronicity    Bilateral carpal tunnel syndrome    History of cervical spinal fracture       Social History     Socioeconomic History    Marital status:      Spouse name: None    Number of children: None    Years of education: None    Highest education level: None   Tobacco Use    Smoking status: Never    Smokeless tobacco: Never   Vaping Use    Vaping status: Never Used   Substance and Sexual Activity    Alcohol  use: Yes     Comment: Alcoholic Drinks/day: socially    Drug use: Yes     Types: Marijuana    Sexual activity: Yes     Partners: Female     Social Drivers of Health     Financial Resource Strain: Low Risk  (2/26/2025)    Financial Resource Strain     Within the past 12 months, have you or your family members you live with been unable to get utilities (heat, electricity) when it was really needed?: No   Food Insecurity: Low Risk  (2/26/2025)    Food Insecurity     Within the past 12 months, did you worry that your food would run out before you got money to buy more?: No     Within the past 12 months, did the food you bought just not last and you didn t have money to get more?: No   Transportation Needs: Low Risk  (2/26/2025)    Transportation Needs     Within the past 12 months, has lack of transportation kept you from medical appointments, getting your medicines, non-medical meetings or appointments, work, or from getting things that you need?: No   Physical Activity: Sufficiently Active (2/26/2025)    Exercise Vital Sign     Days of Exercise per Week: 5 days     Minutes of Exercise per Session: 60 min   Stress: No Stress Concern Present (2/26/2025)    [employer] [place] [employer] - Occupational Stress Questionnaire     Feeling of Stress : Only a little   Social Connections: Unknown (2/26/2025)    Social Connection and Isolation Panel [NHANES]     Frequency of Social Gatherings with Friends and Family: More than three times a week   Interpersonal Safety: Low Risk  (2/26/2025)    Interpersonal Safety     Do you feel physically and emotionally safe where you currently live?: Yes     Within the past 12 months, have you been hit, slapped, kicked or otherwise physically hurt by someone?: No     Within the past 12 months, have you been humiliated or emotionally abused in other ways by your partner or ex-partner?: No   Housing Stability: High Risk (2/26/2025)    Housing Stability     Do you have housing? : No  "    Are you worried about losing your housing?: No       History   Smoking Status    Never   Smokeless Tobacco    Never       OBJECTICE: /86   Pulse 63   Temp 97.8  F (36.6  C) (Oral)   Resp 16   Ht 1.753 m (5' 9\")   Wt 86.7 kg (191 lb 1.9 oz)   SpO2 99%   BMI 28.22 kg/m        Gen - alert, orientated, NAD  Eyes - fundascopic exam limited by the undialated pupil but looks symmetric  ENT - oropharynx clear, TMs clear  Neck - supple, no palpable mass or lymphadenopathy  CV - RRR, no murmur  Resp - lungs CTA  Ab - soft, nontender, no palpable mass or organomegaly   - normal appearance to the external genetalia, normal testicular exam bilaterally, no hernia  Extrem - warm, no edema  Neuro - CN II-XII intact  Skin -very mild micropapular rash, no atypical appearing lesions seen.             2/26/2025   General Health   How would you rate your overall physical health? Excellent   Feel stress (tense, anxious, or unable to sleep) Only a little   (!) STRESS CONCERN      2/26/2025   Nutrition   Three or more servings of calcium each day? Yes   Diet: Regular (no restrictions)   How many servings of fruit and vegetables per day? (!) 2-3   How many sweetened beverages each day? 0-1         2/26/2025   Exercise   Days per week of moderate/strenous exercise 5 days   Average minutes spent exercising at this level 60 min         2/26/2025   Social Factors   Frequency of gathering with friends or relatives More than three times a week   Worry food won't last until get money to buy more No   Food not last or not have enough money for food? No   Do you have housing? (Housing is defined as stable permanent housing and does not include staying ouside in a car, in a tent, in an abandoned building, in an overnight shelter, or couch-surfing.) No   Are you worried about losing your housing? No   Lack of transportation? No   Unable to get utilities (heat,electricity)? No   Want help with housing or utility concern? No   (!) " HOUSING CONCERN PRESENT      2/26/2025   Fall Risk   Fallen 2 or more times in the past year? Yes    Trouble with walking or balance? No    Gait Speed Test (Document in seconds) 2.5   Gait Speed Test Interpretation Less than or equal to 5.00 seconds - PASS       Proxy-reported          2/26/2025   Dental   Dentist two times every year? (!) NO            Today's PHQ-2 Score:       2/26/2025    10:42 AM   PHQ-2 ( 1999 [employer])   Q1: Little interest or pleasure in doing things 0   Q2: Feeling down, depressed or hopeless 0   PHQ-2 Score 0    Q1: Little interest or pleasure in doing things Not at all   Q2: Feeling down, depressed or hopeless Not at all   PHQ-2 Score 0       Patient-reported           2/26/2025   Substance Use   Alcohol more than 3/day or more than 7/wk Yes   How often do you have a drink containing alcohol 2 to 3 times a week   How many alcohol drinks on typical day 1 or 2   How often do you have 5+ drinks at one occasion Less than monthly   Audit 2/3 Score 1   How often not able to stop drinking once started Never   How often failed to do what normally expected Never   How often needed first drink in am after a heavy drinking session Never   How often feeling of guilt or remorse after drinking Never   How often unable to remember what happened the night before Never   Have you or someone else been injured because of your drinking No   Has anyone been concerned or suggested you cut down on drinking No   TOTAL SCORE - AUDIT 4   Do you use any other substances recreationally? (!) CANNABIS PRODUCTS     Social History     Tobacco Use    Smoking status: Never    Smokeless tobacco: Never   Vaping Use    Vaping status: Never Used   Substance Use Topics    Alcohol use: Yes     Comment: Alcoholic Drinks/day: socially    Drug use: Yes     Types: Marijuana           2/26/2025   STI Screening   New sexual partner(s) since last STI/HIV test? (!) YES    Last PSA:   Prostate Specific Antigen Screen   Date Value Ref  Range Status   01/05/2023 1.91 0.00 - 3.50 ng/mL Final   07/13/2021 2.79 0.00 - 3.50 ug/L Final     ASCVD Risk   The 10-year ASCVD risk score (Darien LEE, et al., 2019) is: 6.9%    Values used to calculate the score:      Age: 60 years      Sex: Male      Is Non- : No      Diabetic: No      Tobacco smoker: No      Systolic Blood Pressure: 135 mmHg      Is BP treated: No      HDL Cholesterol: 64 mg/dL      Total Cholesterol: 211 mg/dL        Signed Electronically by: Lambert Hinton MD

## 2025-02-27 LAB — HIV 1+2 AB+HIV1 P24 AG SERPL QL IA: NONREACTIVE
